# Patient Record
Sex: MALE | Race: WHITE
[De-identification: names, ages, dates, MRNs, and addresses within clinical notes are randomized per-mention and may not be internally consistent; named-entity substitution may affect disease eponyms.]

---

## 2017-05-16 ENCOUNTER — HOSPITAL ENCOUNTER (OUTPATIENT)
Dept: HOSPITAL 22 - CARL-LAB | Age: 57
End: 2017-05-16
Attending: NURSE PRACTITIONER
Payer: MEDICAID

## 2017-05-16 DIAGNOSIS — M62.81: ICD-10-CM

## 2017-05-16 DIAGNOSIS — I80.292: ICD-10-CM

## 2017-05-16 DIAGNOSIS — I63.9: Primary | ICD-10-CM

## 2021-01-01 ENCOUNTER — APPOINTMENT (OUTPATIENT)
Dept: PREADMISSION TESTING | Facility: HOSPITAL | Age: 61
End: 2021-01-01

## 2021-01-01 ENCOUNTER — OFFICE VISIT (OUTPATIENT)
Dept: CARDIAC SURGERY | Facility: CLINIC | Age: 61
End: 2021-01-01

## 2021-01-01 ENCOUNTER — HOSPITAL ENCOUNTER (OUTPATIENT)
Facility: HOSPITAL | Age: 61
Setting detail: SURGERY ADMIT
End: 2021-01-01
Attending: STUDENT IN AN ORGANIZED HEALTH CARE EDUCATION/TRAINING PROGRAM | Admitting: STUDENT IN AN ORGANIZED HEALTH CARE EDUCATION/TRAINING PROGRAM

## 2021-01-01 ENCOUNTER — HOSPITAL ENCOUNTER (OUTPATIENT)
Dept: CARDIOLOGY | Facility: HOSPITAL | Age: 61
Discharge: HOME | End: 2021-02-19

## 2021-01-01 VITALS
SYSTOLIC BLOOD PRESSURE: 150 MMHG | HEIGHT: 66 IN | TEMPERATURE: 97.8 F | DIASTOLIC BLOOD PRESSURE: 88 MMHG | WEIGHT: 241 LBS | BODY MASS INDEX: 38.73 KG/M2 | OXYGEN SATURATION: 97 % | HEART RATE: 97 BPM

## 2021-01-01 DIAGNOSIS — Z00.6 ENCOUNTER FOR EXAMINATION FOR NORMAL COMPARISON OR CONTROL IN CLINICAL RESEARCH PROGRAM: ICD-10-CM

## 2021-01-01 DIAGNOSIS — Z00.6 EXAMINATION FOR NORMAL COMPARISON FOR CLINICAL RESEARCH: Primary | ICD-10-CM

## 2021-01-01 DIAGNOSIS — I74.09 CHRONIC DISTAL AORTIC OCCLUSION (HCC): Primary | ICD-10-CM

## 2021-01-01 DIAGNOSIS — E66.01 SEVERE OBESITY WITH BODY MASS INDEX (BMI) OF 35.0 TO 39.9 WITH COMORBIDITY (HCC): ICD-10-CM

## 2021-01-01 DIAGNOSIS — J43.9 PULMONARY EMPHYSEMA, UNSPECIFIED EMPHYSEMA TYPE (HCC): Primary | ICD-10-CM

## 2021-01-01 DIAGNOSIS — I63.10 CEREBROVASCULAR ACCIDENT (CVA) DUE TO EMBOLISM OF PRECEREBRAL ARTERY (HCC): ICD-10-CM

## 2021-01-01 DIAGNOSIS — I73.9 PVD (PERIPHERAL VASCULAR DISEASE) (HCC): Primary | ICD-10-CM

## 2021-01-01 DIAGNOSIS — E66.01 MORBIDLY OBESE (HCC): ICD-10-CM

## 2021-01-01 DIAGNOSIS — J43.9 PULMONARY EMPHYSEMA, UNSPECIFIED EMPHYSEMA TYPE (HCC): ICD-10-CM

## 2021-01-01 PROCEDURE — 99205 OFFICE O/P NEW HI 60 MIN: CPT | Performed by: STUDENT IN AN ORGANIZED HEALTH CARE EDUCATION/TRAINING PROGRAM

## 2021-01-01 RX ORDER — ALBUTEROL SULFATE 0.63 MG/3ML
1 SOLUTION RESPIRATORY (INHALATION) EVERY 6 HOURS PRN
COMMUNITY

## 2021-01-01 RX ORDER — OMEPRAZOLE 20 MG/1
20 CAPSULE, DELAYED RELEASE ORAL DAILY
COMMUNITY

## 2021-01-01 RX ORDER — INSULIN GLARGINE 100 [IU]/ML
INJECTION, SOLUTION SUBCUTANEOUS DAILY
COMMUNITY

## 2021-01-01 RX ORDER — FLUTICASONE PROPIONATE 110 UG/1
1 AEROSOL, METERED RESPIRATORY (INHALATION)
COMMUNITY

## 2021-01-01 RX ORDER — GABAPENTIN 800 MG/1
800 TABLET ORAL 3 TIMES DAILY
COMMUNITY

## 2021-01-01 RX ORDER — LISINOPRIL 20 MG/1
20 TABLET ORAL DAILY
COMMUNITY

## 2021-01-01 RX ORDER — BUPROPION HYDROCHLORIDE 150 MG/1
150 TABLET, EXTENDED RELEASE ORAL 2 TIMES DAILY
COMMUNITY

## 2021-01-01 RX ORDER — WARFARIN SODIUM 2.5 MG/1
2.5 TABLET ORAL
COMMUNITY

## 2021-01-01 RX ORDER — ALBUTEROL SULFATE 2.5 MG/.5ML
SOLUTION RESPIRATORY (INHALATION)
COMMUNITY

## 2021-01-01 RX ORDER — WARFARIN SODIUM 1 MG/1
1 TABLET ORAL
COMMUNITY

## 2021-01-22 ENCOUNTER — HOSPITAL ENCOUNTER (OUTPATIENT)
Age: 61
End: 2021-01-22
Payer: MEDICAID

## 2021-01-22 DIAGNOSIS — I70.213: Primary | ICD-10-CM

## 2021-01-22 PROCEDURE — 93923 UPR/LXTR ART STDY 3+ LVLS: CPT

## 2021-02-03 ENCOUNTER — HOSPITAL ENCOUNTER (OUTPATIENT)
Age: 61
End: 2021-02-03
Payer: MEDICAID

## 2021-02-03 DIAGNOSIS — E78.5: ICD-10-CM

## 2021-02-03 DIAGNOSIS — Z86.718: ICD-10-CM

## 2021-02-03 DIAGNOSIS — I73.9: ICD-10-CM

## 2021-02-03 DIAGNOSIS — I20.9: ICD-10-CM

## 2021-02-03 DIAGNOSIS — I10: ICD-10-CM

## 2021-02-03 DIAGNOSIS — R06.00: Primary | ICD-10-CM

## 2021-02-03 PROCEDURE — 93306 TTE W/DOPPLER COMPLETE: CPT

## 2021-02-03 PROCEDURE — 93017 CV STRESS TEST TRACING ONLY: CPT

## 2021-02-03 PROCEDURE — A9502 TC99M TETROFOSMIN: HCPCS

## 2021-02-03 PROCEDURE — 78452 HT MUSCLE IMAGE SPECT MULT: CPT

## 2021-02-13 ENCOUNTER — HOSPITAL ENCOUNTER (OUTPATIENT)
Age: 61
End: 2021-02-13
Payer: MEDICAID

## 2021-02-13 DIAGNOSIS — I20.9: ICD-10-CM

## 2021-02-13 DIAGNOSIS — Z01.812: Primary | ICD-10-CM

## 2021-02-13 DIAGNOSIS — Z20.822: ICD-10-CM

## 2021-02-13 DIAGNOSIS — R06.00: ICD-10-CM

## 2021-02-13 LAB
ANION GAP SERPL CALC-SCNC: 14.5 MEQ/L (ref 5–15)
BUN SERPL-MCNC: 16 MG/DL (ref 9–20)
CALCIUM SPEC-MCNC: 10 MG/DL (ref 8.4–10.2)
CHLORIDE SPEC-SCNC: 99 MMOL/L (ref 98–107)
CO2 SERPL-SCNC: 23 MMOL/L (ref 22–30)
CREAT BLD-SCNC: 0.8 MG/DL (ref 0.66–1.25)
ESTIMATED GLOMERULAR FILT RATE: 98 ML/MIN (ref 60–?)
GFR (AFRICAN AMERICAN): 119 ML/MIN (ref 60–?)
GLUCOSE: 517 MG/DL (ref 74–100)
HCT VFR BLD CALC: 50.6 % (ref 42–52)
HGB BLD-MCNC: 16.9 G/DL (ref 14.1–18)
MCHC RBC-ENTMCNC: 33.3 G/DL (ref 31.8–35.4)
MCV RBC: 93.5 FL (ref 80–94)
MEAN CORPUSCULAR HEMOGLOBIN: 31.1 PG (ref 27–31.2)
PLATELET # BLD: 193 K/MM3 (ref 142–424)
POTASSIUM: 4.5 MMOL/L (ref 3.5–5.1)
RBC # BLD AUTO: 5.41 M/MM3 (ref 4.6–6.2)
SODIUM SPEC-SCNC: 132 MMOL/L (ref 136–145)
WBC # BLD AUTO: 9.8 K/MM3 (ref 4.8–10.8)

## 2021-02-13 PROCEDURE — 85025 COMPLETE CBC W/AUTO DIFF WBC: CPT

## 2021-02-13 PROCEDURE — 80048 BASIC METABOLIC PNL TOTAL CA: CPT

## 2021-02-13 PROCEDURE — 86328 IA NFCT AB SARSCOV2 COVID19: CPT

## 2021-02-13 PROCEDURE — 36415 COLL VENOUS BLD VENIPUNCTURE: CPT

## 2021-02-15 ENCOUNTER — HOSPITAL ENCOUNTER (OUTPATIENT)
Dept: HOSPITAL 22 - CATHLAB | Age: 61
Discharge: HOME | End: 2021-02-15
Payer: MEDICAID

## 2021-02-15 VITALS
RESPIRATION RATE: 18 BRPM | OXYGEN SATURATION: 94 % | DIASTOLIC BLOOD PRESSURE: 73 MMHG | SYSTOLIC BLOOD PRESSURE: 144 MMHG | HEART RATE: 90 BPM

## 2021-02-15 VITALS
SYSTOLIC BLOOD PRESSURE: 159 MMHG | DIASTOLIC BLOOD PRESSURE: 90 MMHG | OXYGEN SATURATION: 93 % | RESPIRATION RATE: 18 BRPM | HEART RATE: 91 BPM

## 2021-02-15 VITALS
OXYGEN SATURATION: 96 % | HEART RATE: 90 BPM | RESPIRATION RATE: 18 BRPM | SYSTOLIC BLOOD PRESSURE: 150 MMHG | DIASTOLIC BLOOD PRESSURE: 83 MMHG

## 2021-02-15 VITALS
RESPIRATION RATE: 18 BRPM | DIASTOLIC BLOOD PRESSURE: 83 MMHG | HEART RATE: 90 BPM | SYSTOLIC BLOOD PRESSURE: 163 MMHG | OXYGEN SATURATION: 96 %

## 2021-02-15 VITALS
HEART RATE: 89 BPM | RESPIRATION RATE: 16 BRPM | SYSTOLIC BLOOD PRESSURE: 168 MMHG | DIASTOLIC BLOOD PRESSURE: 78 MMHG | OXYGEN SATURATION: 95 %

## 2021-02-15 VITALS
OXYGEN SATURATION: 94 % | HEART RATE: 88 BPM | RESPIRATION RATE: 20 BRPM | DIASTOLIC BLOOD PRESSURE: 75 MMHG | SYSTOLIC BLOOD PRESSURE: 157 MMHG

## 2021-02-15 VITALS
DIASTOLIC BLOOD PRESSURE: 73 MMHG | SYSTOLIC BLOOD PRESSURE: 155 MMHG | HEART RATE: 94 BPM | RESPIRATION RATE: 18 BRPM | OXYGEN SATURATION: 92 %

## 2021-02-15 VITALS
DIASTOLIC BLOOD PRESSURE: 72 MMHG | RESPIRATION RATE: 18 BRPM | OXYGEN SATURATION: 94 % | HEART RATE: 88 BPM | SYSTOLIC BLOOD PRESSURE: 149 MMHG

## 2021-02-15 VITALS
HEART RATE: 88 BPM | SYSTOLIC BLOOD PRESSURE: 161 MMHG | OXYGEN SATURATION: 98 % | DIASTOLIC BLOOD PRESSURE: 84 MMHG | RESPIRATION RATE: 19 BRPM

## 2021-02-15 VITALS
HEART RATE: 89 BPM | DIASTOLIC BLOOD PRESSURE: 79 MMHG | RESPIRATION RATE: 18 BRPM | SYSTOLIC BLOOD PRESSURE: 128 MMHG | OXYGEN SATURATION: 97 %

## 2021-02-15 VITALS
HEART RATE: 94 BPM | SYSTOLIC BLOOD PRESSURE: 164 MMHG | RESPIRATION RATE: 18 BRPM | OXYGEN SATURATION: 95 % | DIASTOLIC BLOOD PRESSURE: 87 MMHG

## 2021-02-15 VITALS
SYSTOLIC BLOOD PRESSURE: 142 MMHG | DIASTOLIC BLOOD PRESSURE: 74 MMHG | HEART RATE: 94 BPM | RESPIRATION RATE: 19 BRPM | OXYGEN SATURATION: 95 %

## 2021-02-15 VITALS
SYSTOLIC BLOOD PRESSURE: 148 MMHG | HEART RATE: 90 BPM | RESPIRATION RATE: 18 BRPM | DIASTOLIC BLOOD PRESSURE: 75 MMHG | OXYGEN SATURATION: 90 %

## 2021-02-15 VITALS
SYSTOLIC BLOOD PRESSURE: 158 MMHG | OXYGEN SATURATION: 87 % | DIASTOLIC BLOOD PRESSURE: 75 MMHG | RESPIRATION RATE: 18 BRPM | HEART RATE: 96 BPM

## 2021-02-15 VITALS
HEART RATE: 92 BPM | RESPIRATION RATE: 19 BRPM | DIASTOLIC BLOOD PRESSURE: 81 MMHG | SYSTOLIC BLOOD PRESSURE: 154 MMHG | OXYGEN SATURATION: 93 %

## 2021-02-15 VITALS
DIASTOLIC BLOOD PRESSURE: 81 MMHG | RESPIRATION RATE: 18 BRPM | HEART RATE: 89 BPM | SYSTOLIC BLOOD PRESSURE: 149 MMHG | OXYGEN SATURATION: 95 %

## 2021-02-15 VITALS
HEART RATE: 96 BPM | SYSTOLIC BLOOD PRESSURE: 141 MMHG | OXYGEN SATURATION: 92 % | DIASTOLIC BLOOD PRESSURE: 66 MMHG | RESPIRATION RATE: 18 BRPM

## 2021-02-15 VITALS
RESPIRATION RATE: 18 BRPM | DIASTOLIC BLOOD PRESSURE: 89 MMHG | OXYGEN SATURATION: 95 % | SYSTOLIC BLOOD PRESSURE: 170 MMHG | HEART RATE: 93 BPM

## 2021-02-15 VITALS
SYSTOLIC BLOOD PRESSURE: 165 MMHG | OXYGEN SATURATION: 94 % | DIASTOLIC BLOOD PRESSURE: 89 MMHG | HEART RATE: 93 BPM | RESPIRATION RATE: 18 BRPM

## 2021-02-15 VITALS
HEART RATE: 96 BPM | RESPIRATION RATE: 18 BRPM | DIASTOLIC BLOOD PRESSURE: 73 MMHG | SYSTOLIC BLOOD PRESSURE: 142 MMHG | OXYGEN SATURATION: 94 %

## 2021-02-15 VITALS
DIASTOLIC BLOOD PRESSURE: 71 MMHG | RESPIRATION RATE: 18 BRPM | SYSTOLIC BLOOD PRESSURE: 188 MMHG | OXYGEN SATURATION: 90 % | HEART RATE: 98 BPM

## 2021-02-15 VITALS
RESPIRATION RATE: 19 BRPM | DIASTOLIC BLOOD PRESSURE: 67 MMHG | OXYGEN SATURATION: 93 % | SYSTOLIC BLOOD PRESSURE: 148 MMHG | HEART RATE: 87 BPM

## 2021-02-15 VITALS
DIASTOLIC BLOOD PRESSURE: 80 MMHG | SYSTOLIC BLOOD PRESSURE: 143 MMHG | OXYGEN SATURATION: 94 % | HEART RATE: 90 BPM | RESPIRATION RATE: 18 BRPM

## 2021-02-15 VITALS
HEART RATE: 91 BPM | SYSTOLIC BLOOD PRESSURE: 148 MMHG | RESPIRATION RATE: 18 BRPM | OXYGEN SATURATION: 94 % | DIASTOLIC BLOOD PRESSURE: 76 MMHG

## 2021-02-15 VITALS
HEART RATE: 95 BPM | OXYGEN SATURATION: 86 % | DIASTOLIC BLOOD PRESSURE: 72 MMHG | SYSTOLIC BLOOD PRESSURE: 144 MMHG | RESPIRATION RATE: 20 BRPM

## 2021-02-15 VITALS
DIASTOLIC BLOOD PRESSURE: 84 MMHG | RESPIRATION RATE: 18 BRPM | SYSTOLIC BLOOD PRESSURE: 157 MMHG | OXYGEN SATURATION: 97 % | HEART RATE: 89 BPM

## 2021-02-15 VITALS
SYSTOLIC BLOOD PRESSURE: 154 MMHG | OXYGEN SATURATION: 98 % | HEART RATE: 89 BPM | DIASTOLIC BLOOD PRESSURE: 80 MMHG | RESPIRATION RATE: 18 BRPM

## 2021-02-15 VITALS
SYSTOLIC BLOOD PRESSURE: 148 MMHG | DIASTOLIC BLOOD PRESSURE: 73 MMHG | RESPIRATION RATE: 19 BRPM | HEART RATE: 90 BPM | OXYGEN SATURATION: 95 %

## 2021-02-15 VITALS
DIASTOLIC BLOOD PRESSURE: 95 MMHG | HEART RATE: 92 BPM | TEMPERATURE: 97.88 F | RESPIRATION RATE: 18 BRPM | SYSTOLIC BLOOD PRESSURE: 157 MMHG | OXYGEN SATURATION: 96 %

## 2021-02-15 VITALS
DIASTOLIC BLOOD PRESSURE: 81 MMHG | OXYGEN SATURATION: 98 % | HEART RATE: 88 BPM | RESPIRATION RATE: 19 BRPM | SYSTOLIC BLOOD PRESSURE: 149 MMHG

## 2021-02-15 VITALS
RESPIRATION RATE: 17 BRPM | SYSTOLIC BLOOD PRESSURE: 136 MMHG | HEART RATE: 91 BPM | OXYGEN SATURATION: 92 % | DIASTOLIC BLOOD PRESSURE: 73 MMHG

## 2021-02-15 VITALS — BODY MASS INDEX: 38.9 KG/M2

## 2021-02-15 VITALS
RESPIRATION RATE: 20 BRPM | DIASTOLIC BLOOD PRESSURE: 85 MMHG | HEART RATE: 97 BPM | OXYGEN SATURATION: 91 % | SYSTOLIC BLOOD PRESSURE: 164 MMHG

## 2021-02-15 VITALS
RESPIRATION RATE: 22 BRPM | HEART RATE: 100 BPM | SYSTOLIC BLOOD PRESSURE: 189 MMHG | OXYGEN SATURATION: 93 % | DIASTOLIC BLOOD PRESSURE: 74 MMHG

## 2021-02-15 VITALS
OXYGEN SATURATION: 95 % | HEART RATE: 93 BPM | RESPIRATION RATE: 18 BRPM | DIASTOLIC BLOOD PRESSURE: 72 MMHG | SYSTOLIC BLOOD PRESSURE: 132 MMHG

## 2021-02-15 VITALS
HEART RATE: 91 BPM | RESPIRATION RATE: 16 BRPM | DIASTOLIC BLOOD PRESSURE: 83 MMHG | OXYGEN SATURATION: 95 % | SYSTOLIC BLOOD PRESSURE: 157 MMHG

## 2021-02-15 VITALS
HEART RATE: 89 BPM | DIASTOLIC BLOOD PRESSURE: 74 MMHG | OXYGEN SATURATION: 95 % | SYSTOLIC BLOOD PRESSURE: 141 MMHG | RESPIRATION RATE: 18 BRPM

## 2021-02-15 VITALS
RESPIRATION RATE: 22 BRPM | DIASTOLIC BLOOD PRESSURE: 83 MMHG | OXYGEN SATURATION: 91 % | HEART RATE: 91 BPM | SYSTOLIC BLOOD PRESSURE: 157 MMHG

## 2021-02-15 VITALS
SYSTOLIC BLOOD PRESSURE: 155 MMHG | DIASTOLIC BLOOD PRESSURE: 72 MMHG | OXYGEN SATURATION: 86 % | HEART RATE: 91 BPM | RESPIRATION RATE: 18 BRPM

## 2021-02-15 VITALS
HEART RATE: 90 BPM | SYSTOLIC BLOOD PRESSURE: 173 MMHG | DIASTOLIC BLOOD PRESSURE: 90 MMHG | RESPIRATION RATE: 19 BRPM | OXYGEN SATURATION: 99 %

## 2021-02-15 VITALS
SYSTOLIC BLOOD PRESSURE: 136 MMHG | OXYGEN SATURATION: 91 % | DIASTOLIC BLOOD PRESSURE: 75 MMHG | HEART RATE: 88 BPM | RESPIRATION RATE: 18 BRPM

## 2021-02-15 VITALS
DIASTOLIC BLOOD PRESSURE: 84 MMHG | RESPIRATION RATE: 18 BRPM | HEART RATE: 90 BPM | OXYGEN SATURATION: 96 % | SYSTOLIC BLOOD PRESSURE: 151 MMHG

## 2021-02-15 VITALS
HEART RATE: 95 BPM | DIASTOLIC BLOOD PRESSURE: 75 MMHG | OXYGEN SATURATION: 89 % | RESPIRATION RATE: 18 BRPM | SYSTOLIC BLOOD PRESSURE: 144 MMHG

## 2021-02-15 VITALS
RESPIRATION RATE: 18 BRPM | DIASTOLIC BLOOD PRESSURE: 71 MMHG | SYSTOLIC BLOOD PRESSURE: 148 MMHG | OXYGEN SATURATION: 92 % | HEART RATE: 88 BPM

## 2021-02-15 VITALS
OXYGEN SATURATION: 98 % | HEART RATE: 89 BPM | RESPIRATION RATE: 20 BRPM | DIASTOLIC BLOOD PRESSURE: 75 MMHG | SYSTOLIC BLOOD PRESSURE: 157 MMHG

## 2021-02-15 VITALS
HEART RATE: 87 BPM | OXYGEN SATURATION: 95 % | RESPIRATION RATE: 22 BRPM | SYSTOLIC BLOOD PRESSURE: 162 MMHG | DIASTOLIC BLOOD PRESSURE: 94 MMHG

## 2021-02-15 DIAGNOSIS — I70.223: Primary | ICD-10-CM

## 2021-02-15 DIAGNOSIS — Z79.899: ICD-10-CM

## 2021-02-15 DIAGNOSIS — E11.51: ICD-10-CM

## 2021-02-15 DIAGNOSIS — R06.00: ICD-10-CM

## 2021-02-15 DIAGNOSIS — I70.0: ICD-10-CM

## 2021-02-15 DIAGNOSIS — I20.9: ICD-10-CM

## 2021-02-15 DIAGNOSIS — Z86.718: ICD-10-CM

## 2021-02-15 DIAGNOSIS — R94.30: ICD-10-CM

## 2021-02-15 DIAGNOSIS — I77.1: ICD-10-CM

## 2021-02-15 DIAGNOSIS — Z79.01: ICD-10-CM

## 2021-02-15 DIAGNOSIS — I10: ICD-10-CM

## 2021-02-15 DIAGNOSIS — E78.5: ICD-10-CM

## 2021-02-15 DIAGNOSIS — Z79.4: ICD-10-CM

## 2021-02-15 LAB
CATHL ACTIVATED CLOTTING TIME: 254 SEC (ref 74–125)
INR PPP: 1.04 (ref 0.9–1.1)
PT BLD: 10.7 SECONDS (ref 9.4–11.8)

## 2021-02-15 PROCEDURE — 85347 COAGULATION TIME ACTIVATED: CPT

## 2021-02-15 PROCEDURE — 75716 ARTERY X-RAYS ARMS/LEGS: CPT

## 2021-02-15 PROCEDURE — 99153 MOD SED SAME PHYS/QHP EA: CPT

## 2021-02-15 PROCEDURE — 99152 MOD SED SAME PHYS/QHP 5/>YRS: CPT

## 2021-02-15 PROCEDURE — C1894 INTRO/SHEATH, NON-LASER: HCPCS

## 2021-02-15 PROCEDURE — 85610 PROTHROMBIN TIME: CPT

## 2021-02-15 PROCEDURE — C1725 CATH, TRANSLUMIN NON-LASER: HCPCS

## 2021-02-15 PROCEDURE — 36247 INS CATH ABD/L-EXT ART 3RD: CPT

## 2021-02-15 PROCEDURE — 93458 L HRT ARTERY/VENTRICLE ANGIO: CPT

## 2021-02-15 PROCEDURE — 36248 INS CATH ABD/L-EXT ART ADDL: CPT

## 2021-02-15 PROCEDURE — C1769 GUIDE WIRE: HCPCS

## 2021-02-17 ENCOUNTER — HOSPITAL ENCOUNTER (OUTPATIENT)
Age: 61
End: 2021-02-17
Payer: MEDICAID

## 2021-02-17 DIAGNOSIS — I71.3: Primary | ICD-10-CM

## 2021-02-17 LAB
BUN SERPL-MCNC: 11 MG/DL (ref 9–20)
CREAT BLD-SCNC: 0.8 MG/DL (ref 0.66–1.25)
ESTIMATED GLOMERULAR FILT RATE: 98 ML/MIN (ref 60–?)
GFR (AFRICAN AMERICAN): 119 ML/MIN (ref 60–?)

## 2021-02-17 PROCEDURE — 36415 COLL VENOUS BLD VENIPUNCTURE: CPT

## 2021-02-17 PROCEDURE — 75635 CT ANGIO ABDOMINAL ARTERIES: CPT

## 2021-02-17 PROCEDURE — 84520 ASSAY OF UREA NITROGEN: CPT

## 2021-02-17 PROCEDURE — 82565 ASSAY OF CREATININE: CPT

## 2021-02-18 PROBLEM — E11.59 TYPE 2 DIABETES MELLITUS WITH VASCULAR DISEASE (HCC): Status: ACTIVE | Noted: 2021-01-01

## 2021-02-18 PROBLEM — J43.9 COPD (CHRONIC OBSTRUCTIVE PULMONARY DISEASE) WITH EMPHYSEMA (HCC): Status: ACTIVE | Noted: 2021-01-01

## 2021-02-18 PROBLEM — I74.09 CHRONIC DISTAL AORTIC OCCLUSION (HCC): Status: ACTIVE | Noted: 2021-01-01

## 2021-02-18 PROBLEM — E66.01 SEVERE OBESITY WITH BODY MASS INDEX (BMI) OF 35.0 TO 39.9 WITH COMORBIDITY (HCC): Status: ACTIVE | Noted: 2021-01-01

## 2021-02-18 PROBLEM — I63.10 CEREBROVASCULAR ACCIDENT (CVA) DUE TO EMBOLISM OF PRECEREBRAL ARTERY (HCC): Status: ACTIVE | Noted: 2021-01-01

## 2021-02-18 NOTE — PROGRESS NOTES
Date: 2021   Patient Name: Evert Villatoro  Address: [address] [place] [id number]  : 1960   Phone #: [unfilled]   MRN: [id number]     Referring Physician: Ayan Hernandez MD    Chief Complaint:      Chief Complaint   Patient presents with   • Leg Pain     Referred by Dr. Hernandez for peripheral vascular disease. Patient has right leg claudication        History of Present Illness: Evert Villatoro is a 60 y.o. male who is here today to establish care with Cardiothoracic Surgery.  This is a 60-year-old male who is been in a wheelchair for 5 years following a stroke.  He is limited in his daily activities by symptoms of claudication.  He also has poor balance but mostly is limited in his walking by claudication which has been present for 5 years.  He was evaluated by a primary care physician and referred to Dr. Hernandez with concern for coronary artery disease.  He underwent a left heart cath which was attempted from femoral access however the wire was unable to pass.  Upon gaining radial access, patient had nonobstructive coronary disease which will be managed medically, but he was found to have a distal aortic occlusion.  Dr. Hernandez gave me a call and explained these findings and the next recommendation is for CT angiogram with runoff.  He was found to have a distal aortic occlusion with a short segment right iliac occlusion.  His left iliac is occluded for much longer length.  He has massive collaterals via his epigastric, lumbar and inferior mesenteric arteries.  The CT angiogram with runoff demonstrated good flow down the femoral vessels with trifurcated runoff.  Upon my exam he is short of breath and has difficulty forming more than a sentence.    Review of Systems:   Review of Systems   Constitution: Negative for chills, fever, malaise/fatigue, night sweats and weight loss.   HENT: Negative for hearing loss, odynophagia and sore throat.    Cardiovascular: Positive for  claudication, dyspnea on exertion, irregular heartbeat and leg swelling. Negative for chest pain, orthopnea and palpitations.   Respiratory: Positive for cough, shortness of breath, sputum production and wheezing. Negative for hemoptysis.    Endocrine: Negative for cold intolerance, heat intolerance, polydipsia, polyphagia and polyuria.   Hematologic/Lymphatic: Bruises/bleeds easily.   Skin: Negative for itching and rash.        Burn on right arm    Musculoskeletal: Positive for back pain and joint pain. Negative for joint swelling and myalgias.   Gastrointestinal: Negative for abdominal pain, constipation, diarrhea, hematemesis, hematochezia, melena, nausea and vomiting.   Genitourinary: Positive for frequency. Negative for dysuria and hematuria.   Neurological: Positive for dizziness, headaches, light-headedness, loss of balance and numbness. Negative for focal weakness and seizures.   Psychiatric/Behavioral: Positive for depression. Negative for suicidal ideas. The patient is nervous/anxious.    All other systems reviewed and are negative.       Past Medical History:   Past Medical History:   Diagnosis Date   • Anxiety    • CHF (congestive heart failure) (CMS/HCC)    • COPD (chronic obstructive pulmonary disease) (CMS/HCC)    • Deep vein thrombosis (CMS/HCC)    • Depression    • Diabetes mellitus (CMS/HCC)    • GERD (gastroesophageal reflux disease)    • Myocardial infarction (CMS/HCC)    • Peripheral vascular disease (CMS/HCC)        Past Surgical History:   Past Surgical History:   Procedure Laterality Date   • ARM WOUND REPAIR / CLOSURE Left     from cut       Family History:   Family History   Problem Relation Age of Onset   • Diabetes Mother    • Cancer Father        Social History:   Social History     Socioeconomic History   • Marital status: Single     Spouse name: Not on file   • Number of children: 5   • Years of education: Not on file   • Highest education level: Not on file   Occupational History      "Employer: DISABLED   Tobacco Use   • Smoking status: Current Every Day Smoker     Packs/day: 1.00     Years: 40.00     Pack years: 40.00     Types: Cigarettes   • Smokeless tobacco: Never Used   Substance and Sexual Activity   • Alcohol use: Not Currently   • Drug use: Never   Social History Narrative    Lives in [place], [place] with daughter        Medications:     Current Outpatient Medications:   •  albuterol (ACCUNEB) 0.63 MG/3ML nebulizer solution, Take 1 ampule by nebulization Every 6 (Six) Hours As Needed for Wheezing., Disp: , Rfl:   •  Albuterol Sulfate 2.5 MG/0.5ML nebulizer solution, Inhale., Disp: , Rfl:   •  buPROPion SR (WELLBUTRIN SR) 150 MG 12 hr tablet, Take 150 mg by mouth 2 (Two) Times a Day., Disp: , Rfl:   •  fluticasone (FLOVENT HFA) 110 MCG/ACT inhaler, Inhale 1 puff 2 (Two) Times a Day., Disp: , Rfl:   •  gabapentin (NEURONTIN) 800 MG tablet, Take 800 mg by mouth 3 (Three) Times a Day., Disp: , Rfl:   •  insulin glargine (LANTUS, SEMGLEE) 100 UNIT/ML injection, Inject  under the skin into the appropriate area as directed Daily., Disp: , Rfl:   •  lisinopril (PRINIVIL,ZESTRIL) 20 MG tablet, Take 20 mg by mouth Daily., Disp: , Rfl:   •  metFORMIN (GLUCOPHAGE) 500 MG tablet, Take 500 mg by mouth 2 (Two) Times a Day With Meals., Disp: , Rfl:   •  omeprazole (priLOSEC) 20 MG capsule, Take 20 mg by mouth Daily., Disp: , Rfl:   •  warfarin (COUMADIN) 1 MG tablet, Take 1 mg by mouth Daily., Disp: , Rfl:   •  warfarin (COUMADIN) 2.5 MG tablet, Take 2.5 mg by mouth Daily., Disp: , Rfl:     Allergies:   No Known Allergies    Physical Exam:  Vital Signs:   Vitals:    02/18/21 1158   BP: 150/88   BP Location: Right arm   Patient Position: Sitting   Pulse: 97   Temp: 97.8 °F (36.6 °C)   SpO2: 97%   Weight: 109 kg (241 lb)   Height: 167.6 cm (66\")     Body mass index is 38.9 kg/m².     Physical Exam  Vitals signs and nursing note reviewed.   Constitutional:       Appearance: He is well-developed. He is obese. " He is not toxic-appearing.   HENT:      Head: Normocephalic.   Eyes:      Conjunctiva/sclera: Conjunctivae normal.      Pupils: Pupils are equal, round, and reactive to light.   Neck:      Musculoskeletal: Normal range of motion.      Vascular: No carotid bruit or JVD.   Cardiovascular:      Rate and Rhythm: Normal rate and regular rhythm.      Pulses:           Carotid pulses are 2+ on the right side and 2+ on the left side.       Radial pulses are 2+ on the right side and 2+ on the left side.        Femoral pulses are 1+ on the right side and 1+ on the left side.       Popliteal pulses are 2+ on the right side and 2+ on the left side.        Dorsalis pedis pulses are 2+ on the right side and 2+ on the left side.        Posterior tibial pulses are 1+ on the right side and 1+ on the left side.      Heart sounds: Normal heart sounds. No murmur. No systolic murmur. No diastolic murmur. No friction rub. No gallop.    Pulmonary:      Effort: Pulmonary effort is normal. No respiratory distress.      Breath sounds: Wheezing present. No rhonchi or rales.   Chest:      Chest wall: No tenderness.   Abdominal:      General: Bowel sounds are normal. There is no distension.      Palpations: Abdomen is soft. There is no mass.      Tenderness: There is no abdominal tenderness. There is no guarding.   Musculoskeletal: Normal range of motion.   Lymphadenopathy:      Cervical: No cervical adenopathy.   Skin:     General: Skin is warm and dry.      Capillary Refill: Capillary refill takes less than 2 seconds.      Findings: No rash.   Neurological:      Mental Status: He is alert and oriented to person, place, and time.      Cranial Nerves: No cranial nerve deficit.   Psychiatric:         Speech: Speech normal.         Behavior: Behavior normal.         Thought Content: Thought content normal.         Judgment: Judgment normal.     Imaging: He has a CT angiogram from 2/17 which demonstrates distal aortic occlusion as well as  bilateral iliac occlusion with good reconstitution and three-vessel runoff.  Personally reviewed by me    Assessment/Plan:   A 60 y.o. male with the following:     Diagnoses and all orders for this visit:    1. Chronic distal aortic occlusion (CMS/HCC) (Primary)    2. Severe obesity with body mass index (BMI) of 35.0 to 39.9 with comorbidity (CMS/HCC)    3. Cerebrovascular accident (CVA) due to embolism of precerebral artery (CMS/HCC)    4. Pulmonary emphysema, unspecified emphysema type (CMS/HCC)        1.  Distal aortic occlusion-he certainly would require revascularization.  His COPD and body habitus likely preclude a transabdominal (aortobifemoral bypass) procedure.  I am worried with his pulmonary status that an aortobifemoral bypass could cause prolonged respiratory failure and possible tracheostomy.  We discussed a extra-anatomic bypass with an axillobifemoral bypass and have scheduled him for the beginning of March.  In the interim I spoke with Dr. Hernandez who will attempt angiographically to get one of the iliac vessels open and place a stent.  If this is the case then he can have a femoral-femoral bypass which would have a increased patency.  2.  BMI of 39-this will complicate things technically with regards to graft rounding.  He is 5 foot 6 and carries most of his weight in his abdomen.  I think with his limited functional capacity that significant weight loss prior to surgery will be impossible  3.  CVA-he is on Coumadin, continue  4.  Emphysema-he has severe pulmonary emphysema.  I have obtained a pulmonary consult to see if any optimization prior to surgery can be performed.  I have ordered PFTs and recommended that he stop smoking.  5.  Diabetes continue current meds.  His last blood sugar on 2/13 was 517.  Predisposes him towards wound infections as well as further small vessel disease    Evert Villatoro  reports that he has been smoking cigarettes. He has a 40.00 pack-year smoking history. He has  never used smokeless tobacco.. I have educated him on the risk of diseases from using tobacco products such as cancer, COPD and heart disease.     I advised him to quit and he is not willing to quit.    I spent 5.5 minutes counseling the patient.             Follow Up:   No follow-ups on file.    The ROS, past medical history, surgical history, family history, social history and vitals were reviewed by myself and corrected as needed.      Ac Adler MD  [facility] Cardiothoracic Surgery   Electronically signed by Ac Adler MD, 02/18/21, 12:08 PM EST.

## 2021-02-22 PROBLEM — I73.9 PVD (PERIPHERAL VASCULAR DISEASE) (HCC): Status: ACTIVE | Noted: 2021-01-01

## 2021-02-24 ENCOUNTER — HOSPITAL ENCOUNTER (OUTPATIENT)
Age: 61
End: 2021-02-24
Payer: MEDICAID

## 2021-02-24 DIAGNOSIS — R06.00: Primary | ICD-10-CM

## 2021-02-24 DIAGNOSIS — R68.89: ICD-10-CM

## 2021-02-24 DIAGNOSIS — I20.9: ICD-10-CM

## 2021-02-24 DIAGNOSIS — Z86.718: ICD-10-CM

## 2021-02-24 DIAGNOSIS — I10: ICD-10-CM

## 2021-02-24 DIAGNOSIS — I70.213: ICD-10-CM

## 2021-02-24 DIAGNOSIS — R94.30: ICD-10-CM

## 2021-02-24 DIAGNOSIS — E78.5: ICD-10-CM

## 2021-02-24 LAB
ANION GAP SERPL CALC-SCNC: 12.7 MEQ/L (ref 5–15)
BUN SERPL-MCNC: 12 MG/DL (ref 9–20)
CALCIUM SPEC-MCNC: 10.1 MG/DL (ref 8.4–10.2)
CHLORIDE SPEC-SCNC: 103 MMOL/L (ref 98–107)
CO2 SERPL-SCNC: 30 MMOL/L (ref 22–30)
CREAT BLD-SCNC: 0.8 MG/DL (ref 0.66–1.25)
ESTIMATED GLOMERULAR FILT RATE: 98 ML/MIN (ref 60–?)
GFR (AFRICAN AMERICAN): 119 ML/MIN (ref 60–?)
GLUCOSE: 297 MG/DL (ref 74–100)
HCT VFR BLD CALC: 51.4 % (ref 42–52)
HGB BLD-MCNC: 16 G/DL (ref 14.1–18)
MCHC RBC-ENTMCNC: 31.2 G/DL (ref 31.8–35.4)
MCV RBC: 97.4 FL (ref 80–94)
MEAN CORPUSCULAR HEMOGLOBIN: 30.4 PG (ref 27–31.2)
PLATELET # BLD: 263 K/MM3 (ref 142–424)
POTASSIUM: 4.7 MMOL/L (ref 3.5–5.1)
RBC # BLD AUTO: 5.28 M/MM3 (ref 4.6–6.2)
SODIUM SPEC-SCNC: 141 MMOL/L (ref 136–145)
WBC # BLD AUTO: 8.9 K/MM3 (ref 4.8–10.8)

## 2021-02-24 PROCEDURE — 36415 COLL VENOUS BLD VENIPUNCTURE: CPT

## 2021-02-24 PROCEDURE — 86328 IA NFCT AB SARSCOV2 COVID19: CPT

## 2021-02-24 PROCEDURE — 80048 BASIC METABOLIC PNL TOTAL CA: CPT

## 2021-02-24 PROCEDURE — 85025 COMPLETE CBC W/AUTO DIFF WBC: CPT

## 2021-02-25 ENCOUNTER — HOSPITAL ENCOUNTER (INPATIENT)
Dept: HOSPITAL 22 - 2ND | Age: 61
LOS: 6 days | DRG: 252 | End: 2021-03-03
Payer: MEDICAID

## 2021-02-25 VITALS
SYSTOLIC BLOOD PRESSURE: 122 MMHG | RESPIRATION RATE: 16 BRPM | DIASTOLIC BLOOD PRESSURE: 63 MMHG | HEART RATE: 97 BPM | OXYGEN SATURATION: 95 %

## 2021-02-25 VITALS
DIASTOLIC BLOOD PRESSURE: 84 MMHG | HEART RATE: 98 BPM | OXYGEN SATURATION: 94 % | SYSTOLIC BLOOD PRESSURE: 133 MMHG | RESPIRATION RATE: 16 BRPM

## 2021-02-25 VITALS
DIASTOLIC BLOOD PRESSURE: 71 MMHG | HEART RATE: 93 BPM | OXYGEN SATURATION: 91 % | SYSTOLIC BLOOD PRESSURE: 153 MMHG | RESPIRATION RATE: 17 BRPM

## 2021-02-25 VITALS
BODY MASS INDEX: 38.9 KG/M2 | BODY MASS INDEX: 40.8 KG/M2 | BODY MASS INDEX: 38.2 KG/M2 | BODY MASS INDEX: 38.4 KG/M2 | BODY MASS INDEX: 38.5 KG/M2 | BODY MASS INDEX: 41 KG/M2 | BODY MASS INDEX: 40.7 KG/M2

## 2021-02-25 VITALS
RESPIRATION RATE: 20 BRPM | DIASTOLIC BLOOD PRESSURE: 61 MMHG | OXYGEN SATURATION: 92 % | SYSTOLIC BLOOD PRESSURE: 130 MMHG | HEART RATE: 99 BPM

## 2021-02-25 VITALS
OXYGEN SATURATION: 92 % | DIASTOLIC BLOOD PRESSURE: 67 MMHG | SYSTOLIC BLOOD PRESSURE: 127 MMHG | RESPIRATION RATE: 20 BRPM | HEART RATE: 99 BPM

## 2021-02-25 VITALS
SYSTOLIC BLOOD PRESSURE: 122 MMHG | DIASTOLIC BLOOD PRESSURE: 58 MMHG | HEART RATE: 95 BPM | TEMPERATURE: 98.24 F | RESPIRATION RATE: 18 BRPM | OXYGEN SATURATION: 92 %

## 2021-02-25 VITALS
DIASTOLIC BLOOD PRESSURE: 77 MMHG | HEART RATE: 94 BPM | SYSTOLIC BLOOD PRESSURE: 126 MMHG | RESPIRATION RATE: 17 BRPM | OXYGEN SATURATION: 94 %

## 2021-02-25 VITALS
SYSTOLIC BLOOD PRESSURE: 117 MMHG | DIASTOLIC BLOOD PRESSURE: 80 MMHG | RESPIRATION RATE: 17 BRPM | OXYGEN SATURATION: 95 % | HEART RATE: 103 BPM

## 2021-02-25 VITALS
HEART RATE: 98 BPM | DIASTOLIC BLOOD PRESSURE: 61 MMHG | OXYGEN SATURATION: 91 % | RESPIRATION RATE: 20 BRPM | SYSTOLIC BLOOD PRESSURE: 150 MMHG

## 2021-02-25 VITALS
SYSTOLIC BLOOD PRESSURE: 131 MMHG | HEART RATE: 98 BPM | DIASTOLIC BLOOD PRESSURE: 81 MMHG | RESPIRATION RATE: 16 BRPM | OXYGEN SATURATION: 95 % | TEMPERATURE: 98.1 F

## 2021-02-25 VITALS
TEMPERATURE: 97.8 F | SYSTOLIC BLOOD PRESSURE: 157 MMHG | RESPIRATION RATE: 22 BRPM | HEART RATE: 86 BPM | DIASTOLIC BLOOD PRESSURE: 83 MMHG | OXYGEN SATURATION: 95 %

## 2021-02-25 VITALS
OXYGEN SATURATION: 91 % | HEART RATE: 92 BPM | SYSTOLIC BLOOD PRESSURE: 141 MMHG | DIASTOLIC BLOOD PRESSURE: 73 MMHG | RESPIRATION RATE: 16 BRPM

## 2021-02-25 VITALS — DIASTOLIC BLOOD PRESSURE: 67 MMHG | OXYGEN SATURATION: 90 % | SYSTOLIC BLOOD PRESSURE: 127 MMHG | HEART RATE: 97 BPM

## 2021-02-25 VITALS
SYSTOLIC BLOOD PRESSURE: 131 MMHG | RESPIRATION RATE: 16 BRPM | OXYGEN SATURATION: 94 % | HEART RATE: 99 BPM | DIASTOLIC BLOOD PRESSURE: 81 MMHG

## 2021-02-25 VITALS
HEART RATE: 98 BPM | RESPIRATION RATE: 16 BRPM | SYSTOLIC BLOOD PRESSURE: 114 MMHG | OXYGEN SATURATION: 99 % | DIASTOLIC BLOOD PRESSURE: 56 MMHG

## 2021-02-25 VITALS
DIASTOLIC BLOOD PRESSURE: 80 MMHG | TEMPERATURE: 98.1 F | OXYGEN SATURATION: 90 % | HEART RATE: 101 BPM | RESPIRATION RATE: 18 BRPM | SYSTOLIC BLOOD PRESSURE: 117 MMHG

## 2021-02-25 VITALS — HEART RATE: 88 BPM

## 2021-02-25 VITALS — OXYGEN SATURATION: 93 %

## 2021-02-25 DIAGNOSIS — I74.5: ICD-10-CM

## 2021-02-25 DIAGNOSIS — I70.228: ICD-10-CM

## 2021-02-25 DIAGNOSIS — I70.223: ICD-10-CM

## 2021-02-25 DIAGNOSIS — Z79.01: ICD-10-CM

## 2021-02-25 DIAGNOSIS — Z72.0: ICD-10-CM

## 2021-02-25 DIAGNOSIS — E11.51: Primary | ICD-10-CM

## 2021-02-25 DIAGNOSIS — I70.92: ICD-10-CM

## 2021-02-25 DIAGNOSIS — Z79.4: ICD-10-CM

## 2021-02-25 DIAGNOSIS — I46.9: ICD-10-CM

## 2021-02-25 DIAGNOSIS — J44.9: ICD-10-CM

## 2021-02-25 DIAGNOSIS — I70.0: ICD-10-CM

## 2021-02-25 DIAGNOSIS — J96.00: ICD-10-CM

## 2021-02-25 DIAGNOSIS — Z99.81: ICD-10-CM

## 2021-02-25 DIAGNOSIS — F10.29: ICD-10-CM

## 2021-02-25 DIAGNOSIS — E66.9: ICD-10-CM

## 2021-02-25 DIAGNOSIS — I10: ICD-10-CM

## 2021-02-25 DIAGNOSIS — I77.1: ICD-10-CM

## 2021-02-25 LAB
ANION GAP SERPL CALC-SCNC: 12.4 MEQ/L (ref 5–15)
APTT PPP: 115.1 SECONDS (ref 23.6–34)
APTT PPP: 32.6 SECONDS (ref 23.6–34)
APTT PPP: 59 SECONDS (ref 23.6–34)
APTT PPP: 71.2 SECONDS (ref 23.6–34)
BUN SERPL-MCNC: 10 MG/DL (ref 9–20)
CALCIUM SPEC-MCNC: 8.7 MG/DL (ref 8.4–10.2)
CHLORIDE SPEC-SCNC: 106 MMOL/L (ref 98–107)
CO2 SERPL-SCNC: 21 MMOL/L (ref 22–30)
COLOR UR: YELLOW
CREAT BLD-SCNC: 0.7 MG/DL (ref 0.66–1.25)
CREATININE CLEARANCE ESTIMATED: 120 ML/MIN (ref 50–200)
ESTIMATED GLOMERULAR FILT RATE: 115 ML/MIN (ref 60–?)
GFR (AFRICAN AMERICAN): 139 ML/MIN (ref 60–?)
GLUCOSE BLDC GLUCOMTR-MCNC: 228 MG/DL (ref 70–110)
GLUCOSE: 244 MG/DL (ref 74–100)
INR PPP: 1.74 (ref 0.9–1.1)
INR PPP: 1.91 (ref 0.9–1.1)
MAGNESIUM: 1.6 MG/DL (ref 1.6–2.3)
MICRO URNS: (no result)
PH UR: 5 [PH] (ref 5–8.5)
POTASSIUM: 4.4 MMOL/L (ref 3.5–5.1)
PT BLD: 18.4 SECONDS (ref 9.4–11.8)
PT BLD: 20 SECONDS (ref 9.4–11.8)
SODIUM SPEC-SCNC: 135 MMOL/L (ref 136–145)
SP GR UR: 1.01 (ref 1–1.03)
UROBILINOGEN UR QL: 0.2 EU/DL

## 2021-02-25 PROCEDURE — 80048 BASIC METABOLIC PNL TOTAL CA: CPT

## 2021-02-25 PROCEDURE — 81001 URINALYSIS AUTO W/SCOPE: CPT

## 2021-02-25 PROCEDURE — C1725 CATH, TRANSLUMIN NON-LASER: HCPCS

## 2021-02-25 PROCEDURE — 82962 GLUCOSE BLOOD TEST: CPT

## 2021-02-25 PROCEDURE — 047D34Z DILATION OF LEFT COMMON ILIAC ARTERY WITH DRUG-ELUTING INTRALUMINAL DEVICE, PERCUTANEOUS APPROACH: ICD-10-PCS | Performed by: INTERNAL MEDICINE

## 2021-02-25 PROCEDURE — 37221: CPT

## 2021-02-25 PROCEDURE — 94760 N-INVAS EAR/PLS OXIMETRY 1: CPT

## 2021-02-25 PROCEDURE — 82803 BLOOD GASES ANY COMBINATION: CPT

## 2021-02-25 PROCEDURE — C1766 INTRO/SHEATH,STRBLE,NON-PEEL: HCPCS

## 2021-02-25 PROCEDURE — 83735 ASSAY OF MAGNESIUM: CPT

## 2021-02-25 PROCEDURE — 74019 RADEX ABDOMEN 2 VIEWS: CPT

## 2021-02-25 PROCEDURE — 85610 PROTHROMBIN TIME: CPT

## 2021-02-25 PROCEDURE — 93005 ELECTROCARDIOGRAM TRACING: CPT

## 2021-02-25 PROCEDURE — 74176 CT ABD & PELVIS W/O CONTRAST: CPT

## 2021-02-25 PROCEDURE — 99152 MOD SED SAME PHYS/QHP 5/>YRS: CPT

## 2021-02-25 PROCEDURE — 80053 COMPREHEN METABOLIC PANEL: CPT

## 2021-02-25 PROCEDURE — 37236 OPEN/PERQ PLACE STENT 1ST: CPT

## 2021-02-25 PROCEDURE — C1894 INTRO/SHEATH, NON-LASER: HCPCS

## 2021-02-25 PROCEDURE — 85730 THROMBOPLASTIN TIME PARTIAL: CPT

## 2021-02-25 PROCEDURE — C1769 GUIDE WIRE: HCPCS

## 2021-02-25 PROCEDURE — 36415 COLL VENOUS BLD VENIPUNCTURE: CPT

## 2021-02-25 PROCEDURE — 85025 COMPLETE CBC W/AUTO DIFF WBC: CPT

## 2021-02-25 PROCEDURE — C1876 STENT, NON-COA/NON-COV W/DEL: HCPCS

## 2021-02-25 PROCEDURE — U0003 INFECTIOUS AGENT DETECTION BY NUCLEIC ACID (DNA OR RNA); SEVERE ACUTE RESPIRATORY SYNDROME CORONAVIRUS 2 (SARS-COV-2) (CORONAVIRUS DISEASE [COVID-19]), AMPLIFIED PROBE TECHNIQUE, MAKING USE OF HIGH THROUGHPUT TECHNOLOGIES AS DESCRIBED BY CMS-2020-01-R: HCPCS

## 2021-02-25 PROCEDURE — C1760 CLOSURE DEV, VASC: HCPCS

## 2021-02-25 PROCEDURE — 85007 BL SMEAR W/DIFF WBC COUNT: CPT

## 2021-02-25 PROCEDURE — 94002 VENT MGMT INPAT INIT DAY: CPT

## 2021-02-25 PROCEDURE — 86850 RBC ANTIBODY SCREEN: CPT

## 2021-02-25 PROCEDURE — 37223: CPT

## 2021-02-25 PROCEDURE — 37226: CPT

## 2021-02-25 PROCEDURE — 71045 X-RAY EXAM CHEST 1 VIEW: CPT

## 2021-02-25 PROCEDURE — 85347 COAGULATION TIME ACTIVATED: CPT

## 2021-02-25 PROCEDURE — 80076 HEPATIC FUNCTION PANEL: CPT

## 2021-02-25 PROCEDURE — 94640 AIRWAY INHALATION TREATMENT: CPT

## 2021-02-25 PROCEDURE — 99153 MOD SED SAME PHYS/QHP EA: CPT

## 2021-02-25 PROCEDURE — 86328 IA NFCT AB SARSCOV2 COVID19: CPT

## 2021-02-25 NOTE — PC.NURSE
Heparin gtt increased to 1700 units/hr per [facility] @ 2130. Heparin gtt increased to 1800 units/hr @ 2314 per [facility]. Draw PTT in an hour.

## 2021-02-26 VITALS
SYSTOLIC BLOOD PRESSURE: 107 MMHG | DIASTOLIC BLOOD PRESSURE: 43 MMHG | OXYGEN SATURATION: 91 % | RESPIRATION RATE: 26 BRPM | HEART RATE: 120 BPM

## 2021-02-26 VITALS
DIASTOLIC BLOOD PRESSURE: 50 MMHG | OXYGEN SATURATION: 98 % | HEART RATE: 111 BPM | RESPIRATION RATE: 24 BRPM | SYSTOLIC BLOOD PRESSURE: 80 MMHG

## 2021-02-26 VITALS
RESPIRATION RATE: 17 BRPM | HEART RATE: 108 BPM | SYSTOLIC BLOOD PRESSURE: 87 MMHG | DIASTOLIC BLOOD PRESSURE: 39 MMHG | OXYGEN SATURATION: 93 %

## 2021-02-26 VITALS — OXYGEN SATURATION: 100 % | HEART RATE: 106 BPM | SYSTOLIC BLOOD PRESSURE: 83 MMHG | DIASTOLIC BLOOD PRESSURE: 34 MMHG

## 2021-02-26 VITALS
DIASTOLIC BLOOD PRESSURE: 44 MMHG | OXYGEN SATURATION: 94 % | HEART RATE: 95 BPM | SYSTOLIC BLOOD PRESSURE: 88 MMHG | RESPIRATION RATE: 19 BRPM

## 2021-02-26 VITALS
RESPIRATION RATE: 20 BRPM | HEART RATE: 98 BPM | DIASTOLIC BLOOD PRESSURE: 59 MMHG | SYSTOLIC BLOOD PRESSURE: 144 MMHG | TEMPERATURE: 98.06 F | OXYGEN SATURATION: 92 %

## 2021-02-26 VITALS
DIASTOLIC BLOOD PRESSURE: 49 MMHG | RESPIRATION RATE: 20 BRPM | SYSTOLIC BLOOD PRESSURE: 102 MMHG | HEART RATE: 93 BPM | OXYGEN SATURATION: 95 %

## 2021-02-26 VITALS — OXYGEN SATURATION: 94 % | HEART RATE: 102 BPM | DIASTOLIC BLOOD PRESSURE: 46 MMHG | SYSTOLIC BLOOD PRESSURE: 78 MMHG

## 2021-02-26 VITALS
SYSTOLIC BLOOD PRESSURE: 165 MMHG | OXYGEN SATURATION: 96 % | HEART RATE: 113 BPM | DIASTOLIC BLOOD PRESSURE: 100 MMHG | RESPIRATION RATE: 20 BRPM

## 2021-02-26 VITALS
RESPIRATION RATE: 20 BRPM | SYSTOLIC BLOOD PRESSURE: 159 MMHG | DIASTOLIC BLOOD PRESSURE: 84 MMHG | HEART RATE: 117 BPM | OXYGEN SATURATION: 95 %

## 2021-02-26 VITALS — OXYGEN SATURATION: 93 % | HEART RATE: 107 BPM | SYSTOLIC BLOOD PRESSURE: 86 MMHG | DIASTOLIC BLOOD PRESSURE: 42 MMHG

## 2021-02-26 VITALS
DIASTOLIC BLOOD PRESSURE: 45 MMHG | HEART RATE: 94 BPM | SYSTOLIC BLOOD PRESSURE: 101 MMHG | OXYGEN SATURATION: 96 % | RESPIRATION RATE: 18 BRPM

## 2021-02-26 VITALS
OXYGEN SATURATION: 97 % | SYSTOLIC BLOOD PRESSURE: 164 MMHG | RESPIRATION RATE: 20 BRPM | DIASTOLIC BLOOD PRESSURE: 85 MMHG | HEART RATE: 115 BPM

## 2021-02-26 VITALS
HEART RATE: 96 BPM | RESPIRATION RATE: 17 BRPM | SYSTOLIC BLOOD PRESSURE: 105 MMHG | OXYGEN SATURATION: 93 % | DIASTOLIC BLOOD PRESSURE: 49 MMHG

## 2021-02-26 VITALS
OXYGEN SATURATION: 98 % | SYSTOLIC BLOOD PRESSURE: 199 MMHG | RESPIRATION RATE: 21 BRPM | HEART RATE: 113 BPM | DIASTOLIC BLOOD PRESSURE: 93 MMHG

## 2021-02-26 VITALS
SYSTOLIC BLOOD PRESSURE: 104 MMHG | RESPIRATION RATE: 17 BRPM | OXYGEN SATURATION: 90 % | DIASTOLIC BLOOD PRESSURE: 48 MMHG | HEART RATE: 99 BPM

## 2021-02-26 VITALS
OXYGEN SATURATION: 94 % | RESPIRATION RATE: 16 BRPM | SYSTOLIC BLOOD PRESSURE: 98 MMHG | HEART RATE: 93 BPM | DIASTOLIC BLOOD PRESSURE: 48 MMHG

## 2021-02-26 VITALS
OXYGEN SATURATION: 91 % | HEART RATE: 96 BPM | RESPIRATION RATE: 17 BRPM | SYSTOLIC BLOOD PRESSURE: 140 MMHG | TEMPERATURE: 98.06 F | DIASTOLIC BLOOD PRESSURE: 71 MMHG

## 2021-02-26 VITALS
SYSTOLIC BLOOD PRESSURE: 171 MMHG | HEART RATE: 117 BPM | RESPIRATION RATE: 22 BRPM | DIASTOLIC BLOOD PRESSURE: 89 MMHG | OXYGEN SATURATION: 97 %

## 2021-02-26 VITALS
RESPIRATION RATE: 22 BRPM | HEART RATE: 119 BPM | TEMPERATURE: 100.4 F | OXYGEN SATURATION: 96 % | DIASTOLIC BLOOD PRESSURE: 88 MMHG | SYSTOLIC BLOOD PRESSURE: 169 MMHG

## 2021-02-26 VITALS — HEART RATE: 93 BPM | DIASTOLIC BLOOD PRESSURE: 44 MMHG | SYSTOLIC BLOOD PRESSURE: 108 MMHG | OXYGEN SATURATION: 92 %

## 2021-02-26 VITALS
RESPIRATION RATE: 22 BRPM | OXYGEN SATURATION: 91 % | SYSTOLIC BLOOD PRESSURE: 130 MMHG | HEART RATE: 121 BPM | DIASTOLIC BLOOD PRESSURE: 66 MMHG

## 2021-02-26 VITALS
HEART RATE: 111 BPM | DIASTOLIC BLOOD PRESSURE: 46 MMHG | OXYGEN SATURATION: 97 % | SYSTOLIC BLOOD PRESSURE: 68 MMHG | RESPIRATION RATE: 24 BRPM

## 2021-02-26 VITALS
RESPIRATION RATE: 20 BRPM | DIASTOLIC BLOOD PRESSURE: 87 MMHG | OXYGEN SATURATION: 98 % | SYSTOLIC BLOOD PRESSURE: 177 MMHG | HEART RATE: 109 BPM

## 2021-02-26 VITALS
OXYGEN SATURATION: 96 % | SYSTOLIC BLOOD PRESSURE: 101 MMHG | RESPIRATION RATE: 20 BRPM | HEART RATE: 99 BPM | DIASTOLIC BLOOD PRESSURE: 46 MMHG

## 2021-02-26 VITALS
OXYGEN SATURATION: 94 % | RESPIRATION RATE: 18 BRPM | SYSTOLIC BLOOD PRESSURE: 169 MMHG | DIASTOLIC BLOOD PRESSURE: 83 MMHG | HEART RATE: 99 BPM | TEMPERATURE: 98.8 F

## 2021-02-26 VITALS
RESPIRATION RATE: 20 BRPM | HEART RATE: 117 BPM | SYSTOLIC BLOOD PRESSURE: 164 MMHG | OXYGEN SATURATION: 96 % | DIASTOLIC BLOOD PRESSURE: 78 MMHG

## 2021-02-26 VITALS
HEART RATE: 121 BPM | OXYGEN SATURATION: 97 % | RESPIRATION RATE: 20 BRPM | DIASTOLIC BLOOD PRESSURE: 71 MMHG | SYSTOLIC BLOOD PRESSURE: 143 MMHG

## 2021-02-26 VITALS — HEART RATE: 106 BPM | OXYGEN SATURATION: 94 % | SYSTOLIC BLOOD PRESSURE: 82 MMHG | DIASTOLIC BLOOD PRESSURE: 44 MMHG

## 2021-02-26 VITALS
DIASTOLIC BLOOD PRESSURE: 51 MMHG | SYSTOLIC BLOOD PRESSURE: 88 MMHG | RESPIRATION RATE: 20 BRPM | OXYGEN SATURATION: 93 % | HEART RATE: 99 BPM

## 2021-02-26 VITALS
DIASTOLIC BLOOD PRESSURE: 33 MMHG | OXYGEN SATURATION: 93 % | HEART RATE: 107 BPM | SYSTOLIC BLOOD PRESSURE: 106 MMHG | RESPIRATION RATE: 20 BRPM

## 2021-02-26 VITALS
DIASTOLIC BLOOD PRESSURE: 85 MMHG | OXYGEN SATURATION: 98 % | SYSTOLIC BLOOD PRESSURE: 175 MMHG | RESPIRATION RATE: 19 BRPM | HEART RATE: 108 BPM

## 2021-02-26 VITALS
SYSTOLIC BLOOD PRESSURE: 177 MMHG | RESPIRATION RATE: 20 BRPM | OXYGEN SATURATION: 91 % | DIASTOLIC BLOOD PRESSURE: 81 MMHG | HEART RATE: 121 BPM

## 2021-02-26 VITALS — SYSTOLIC BLOOD PRESSURE: 154 MMHG | DIASTOLIC BLOOD PRESSURE: 90 MMHG | OXYGEN SATURATION: 96 % | HEART RATE: 113 BPM

## 2021-02-26 VITALS
DIASTOLIC BLOOD PRESSURE: 50 MMHG | RESPIRATION RATE: 18 BRPM | OXYGEN SATURATION: 95 % | SYSTOLIC BLOOD PRESSURE: 97 MMHG | HEART RATE: 94 BPM

## 2021-02-26 VITALS
HEART RATE: 112 BPM | RESPIRATION RATE: 22 BRPM | DIASTOLIC BLOOD PRESSURE: 50 MMHG | OXYGEN SATURATION: 97 % | SYSTOLIC BLOOD PRESSURE: 70 MMHG

## 2021-02-26 VITALS — HEART RATE: 97 BPM | OXYGEN SATURATION: 94 % | SYSTOLIC BLOOD PRESSURE: 110 MMHG | DIASTOLIC BLOOD PRESSURE: 48 MMHG

## 2021-02-26 VITALS — DIASTOLIC BLOOD PRESSURE: 32 MMHG | SYSTOLIC BLOOD PRESSURE: 96 MMHG | HEART RATE: 101 BPM | OXYGEN SATURATION: 93 %

## 2021-02-26 VITALS
SYSTOLIC BLOOD PRESSURE: 162 MMHG | RESPIRATION RATE: 20 BRPM | OXYGEN SATURATION: 96 % | DIASTOLIC BLOOD PRESSURE: 86 MMHG | HEART RATE: 115 BPM

## 2021-02-26 VITALS
OXYGEN SATURATION: 97 % | SYSTOLIC BLOOD PRESSURE: 180 MMHG | DIASTOLIC BLOOD PRESSURE: 87 MMHG | RESPIRATION RATE: 22 BRPM | HEART RATE: 109 BPM

## 2021-02-26 VITALS
OXYGEN SATURATION: 97 % | SYSTOLIC BLOOD PRESSURE: 105 MMHG | DIASTOLIC BLOOD PRESSURE: 52 MMHG | RESPIRATION RATE: 17 BRPM | TEMPERATURE: 99.32 F | HEART RATE: 100 BPM

## 2021-02-26 VITALS
HEART RATE: 92 BPM | OXYGEN SATURATION: 94 % | RESPIRATION RATE: 22 BRPM | SYSTOLIC BLOOD PRESSURE: 117 MMHG | DIASTOLIC BLOOD PRESSURE: 81 MMHG

## 2021-02-26 VITALS
DIASTOLIC BLOOD PRESSURE: 82 MMHG | OXYGEN SATURATION: 96 % | SYSTOLIC BLOOD PRESSURE: 159 MMHG | RESPIRATION RATE: 22 BRPM | HEART RATE: 117 BPM

## 2021-02-26 VITALS
OXYGEN SATURATION: 94 % | DIASTOLIC BLOOD PRESSURE: 47 MMHG | RESPIRATION RATE: 22 BRPM | SYSTOLIC BLOOD PRESSURE: 92 MMHG | HEART RATE: 93 BPM

## 2021-02-26 VITALS — SYSTOLIC BLOOD PRESSURE: 75 MMHG | DIASTOLIC BLOOD PRESSURE: 48 MMHG | OXYGEN SATURATION: 92 %

## 2021-02-26 VITALS
RESPIRATION RATE: 19 BRPM | HEART RATE: 118 BPM | SYSTOLIC BLOOD PRESSURE: 165 MMHG | TEMPERATURE: 98.96 F | DIASTOLIC BLOOD PRESSURE: 84 MMHG | OXYGEN SATURATION: 97 %

## 2021-02-26 VITALS
HEART RATE: 115 BPM | DIASTOLIC BLOOD PRESSURE: 85 MMHG | SYSTOLIC BLOOD PRESSURE: 126 MMHG | RESPIRATION RATE: 20 BRPM | OXYGEN SATURATION: 96 %

## 2021-02-26 VITALS
HEART RATE: 108 BPM | RESPIRATION RATE: 19 BRPM | DIASTOLIC BLOOD PRESSURE: 83 MMHG | SYSTOLIC BLOOD PRESSURE: 166 MMHG | OXYGEN SATURATION: 97 %

## 2021-02-26 VITALS
TEMPERATURE: 99.6 F | RESPIRATION RATE: 22 BRPM | OXYGEN SATURATION: 95 % | SYSTOLIC BLOOD PRESSURE: 90 MMHG | HEART RATE: 101 BPM | DIASTOLIC BLOOD PRESSURE: 46 MMHG

## 2021-02-26 VITALS
OXYGEN SATURATION: 96 % | RESPIRATION RATE: 18 BRPM | DIASTOLIC BLOOD PRESSURE: 88 MMHG | SYSTOLIC BLOOD PRESSURE: 160 MMHG | TEMPERATURE: 99 F | HEART RATE: 114 BPM

## 2021-02-26 VITALS
OXYGEN SATURATION: 96 % | RESPIRATION RATE: 22 BRPM | SYSTOLIC BLOOD PRESSURE: 160 MMHG | DIASTOLIC BLOOD PRESSURE: 84 MMHG | HEART RATE: 115 BPM

## 2021-02-26 VITALS — DIASTOLIC BLOOD PRESSURE: 78 MMHG | OXYGEN SATURATION: 95 % | HEART RATE: 115 BPM | SYSTOLIC BLOOD PRESSURE: 151 MMHG

## 2021-02-26 VITALS — SYSTOLIC BLOOD PRESSURE: 173 MMHG | HEART RATE: 114 BPM | OXYGEN SATURATION: 96 % | DIASTOLIC BLOOD PRESSURE: 90 MMHG

## 2021-02-26 VITALS
RESPIRATION RATE: 22 BRPM | HEART RATE: 102 BPM | DIASTOLIC BLOOD PRESSURE: 31 MMHG | OXYGEN SATURATION: 90 % | SYSTOLIC BLOOD PRESSURE: 89 MMHG

## 2021-02-26 VITALS
SYSTOLIC BLOOD PRESSURE: 92 MMHG | OXYGEN SATURATION: 95 % | DIASTOLIC BLOOD PRESSURE: 47 MMHG | RESPIRATION RATE: 22 BRPM | HEART RATE: 112 BPM

## 2021-02-26 VITALS
SYSTOLIC BLOOD PRESSURE: 110 MMHG | OXYGEN SATURATION: 95 % | HEART RATE: 94 BPM | RESPIRATION RATE: 20 BRPM | DIASTOLIC BLOOD PRESSURE: 44 MMHG

## 2021-02-26 VITALS — OXYGEN SATURATION: 93 % | HEART RATE: 95 BPM | SYSTOLIC BLOOD PRESSURE: 103 MMHG | DIASTOLIC BLOOD PRESSURE: 47 MMHG

## 2021-02-26 VITALS — SYSTOLIC BLOOD PRESSURE: 103 MMHG | DIASTOLIC BLOOD PRESSURE: 48 MMHG | HEART RATE: 103 BPM | OXYGEN SATURATION: 97 %

## 2021-02-26 VITALS
DIASTOLIC BLOOD PRESSURE: 43 MMHG | RESPIRATION RATE: 17 BRPM | HEART RATE: 104 BPM | OXYGEN SATURATION: 98 % | SYSTOLIC BLOOD PRESSURE: 96 MMHG

## 2021-02-26 VITALS — DIASTOLIC BLOOD PRESSURE: 45 MMHG | OXYGEN SATURATION: 97 % | HEART RATE: 97 BPM | SYSTOLIC BLOOD PRESSURE: 114 MMHG

## 2021-02-26 VITALS
OXYGEN SATURATION: 95 % | RESPIRATION RATE: 18 BRPM | HEART RATE: 114 BPM | SYSTOLIC BLOOD PRESSURE: 163 MMHG | DIASTOLIC BLOOD PRESSURE: 83 MMHG

## 2021-02-26 VITALS
SYSTOLIC BLOOD PRESSURE: 157 MMHG | HEART RATE: 121 BPM | OXYGEN SATURATION: 94 % | RESPIRATION RATE: 20 BRPM | DIASTOLIC BLOOD PRESSURE: 81 MMHG

## 2021-02-26 VITALS — SYSTOLIC BLOOD PRESSURE: 89 MMHG | HEART RATE: 97 BPM | DIASTOLIC BLOOD PRESSURE: 45 MMHG | OXYGEN SATURATION: 93 %

## 2021-02-26 VITALS — DIASTOLIC BLOOD PRESSURE: 43 MMHG | HEART RATE: 94 BPM | SYSTOLIC BLOOD PRESSURE: 91 MMHG | OXYGEN SATURATION: 91 %

## 2021-02-26 VITALS — DIASTOLIC BLOOD PRESSURE: 57 MMHG | HEART RATE: 96 BPM | OXYGEN SATURATION: 92 % | SYSTOLIC BLOOD PRESSURE: 107 MMHG

## 2021-02-26 VITALS
RESPIRATION RATE: 17 BRPM | DIASTOLIC BLOOD PRESSURE: 47 MMHG | SYSTOLIC BLOOD PRESSURE: 104 MMHG | HEART RATE: 95 BPM | OXYGEN SATURATION: 92 %

## 2021-02-26 VITALS
DIASTOLIC BLOOD PRESSURE: 64 MMHG | OXYGEN SATURATION: 92 % | SYSTOLIC BLOOD PRESSURE: 154 MMHG | HEART RATE: 121 BPM | RESPIRATION RATE: 20 BRPM

## 2021-02-26 VITALS — SYSTOLIC BLOOD PRESSURE: 97 MMHG | OXYGEN SATURATION: 95 % | DIASTOLIC BLOOD PRESSURE: 44 MMHG | HEART RATE: 96 BPM

## 2021-02-26 VITALS
OXYGEN SATURATION: 93 % | RESPIRATION RATE: 23 BRPM | SYSTOLIC BLOOD PRESSURE: 107 MMHG | HEART RATE: 98 BPM | DIASTOLIC BLOOD PRESSURE: 46 MMHG

## 2021-02-26 VITALS
OXYGEN SATURATION: 95 % | HEART RATE: 116 BPM | RESPIRATION RATE: 22 BRPM | SYSTOLIC BLOOD PRESSURE: 159 MMHG | DIASTOLIC BLOOD PRESSURE: 85 MMHG

## 2021-02-26 VITALS — SYSTOLIC BLOOD PRESSURE: 151 MMHG | DIASTOLIC BLOOD PRESSURE: 88 MMHG | HEART RATE: 114 BPM | OXYGEN SATURATION: 96 %

## 2021-02-26 VITALS — HEART RATE: 109 BPM | OXYGEN SATURATION: 92 % | SYSTOLIC BLOOD PRESSURE: 90 MMHG | DIASTOLIC BLOOD PRESSURE: 46 MMHG

## 2021-02-26 VITALS — HEART RATE: 111 BPM | DIASTOLIC BLOOD PRESSURE: 93 MMHG | SYSTOLIC BLOOD PRESSURE: 115 MMHG | OXYGEN SATURATION: 94 %

## 2021-02-26 VITALS
HEART RATE: 114 BPM | OXYGEN SATURATION: 97 % | RESPIRATION RATE: 22 BRPM | DIASTOLIC BLOOD PRESSURE: 86 MMHG | SYSTOLIC BLOOD PRESSURE: 167 MMHG

## 2021-02-26 VITALS — HEART RATE: 113 BPM | OXYGEN SATURATION: 97 %

## 2021-02-26 VITALS
HEART RATE: 114 BPM | SYSTOLIC BLOOD PRESSURE: 145 MMHG | DIASTOLIC BLOOD PRESSURE: 87 MMHG | OXYGEN SATURATION: 97 % | RESPIRATION RATE: 22 BRPM

## 2021-02-26 VITALS — HEART RATE: 103 BPM | OXYGEN SATURATION: 92 % | SYSTOLIC BLOOD PRESSURE: 94 MMHG | DIASTOLIC BLOOD PRESSURE: 50 MMHG

## 2021-02-26 VITALS
SYSTOLIC BLOOD PRESSURE: 104 MMHG | HEART RATE: 94 BPM | RESPIRATION RATE: 18 BRPM | OXYGEN SATURATION: 95 % | DIASTOLIC BLOOD PRESSURE: 46 MMHG

## 2021-02-26 LAB
ANION GAP SERPL CALC-SCNC: 10.2 MEQ/L (ref 5–15)
APTT PPP: 122.8 SECONDS (ref 23.6–34)
APTT PPP: 275 SECONDS (ref 23.6–34)
APTT PPP: 46.7 SECONDS (ref 23.6–34)
APTT PPP: 59.7 SECONDS (ref 23.6–34)
APTT PPP: 59.9 SECONDS (ref 23.6–34)
APTT PPP: 62.3 SECONDS (ref 23.6–34)
APTT PPP: 63.4 SECONDS (ref 23.6–34)
APTT PPP: 98.8 SECONDS (ref 23.6–34)
BUN SERPL-MCNC: 11 MG/DL (ref 9–20)
CALCIUM SPEC-MCNC: 8.4 MG/DL (ref 8.4–10.2)
CATHL ACTIVATED CLOTTING TIME: 375 SEC (ref 74–125)
CATHL ACTIVATED CLOTTING TIME: > 400 SEC (ref 74–125)
CATHL ACTIVATED CLOTTING TIME: > 400 SEC (ref 74–125)
CELLS COUNTED: 100
CHLORIDE SPEC-SCNC: 102 MMOL/L (ref 98–107)
CO2 SERPL-SCNC: 25 MMOL/L (ref 22–30)
CREAT BLD-SCNC: 0.8 MG/DL (ref 0.66–1.25)
CREATININE CLEARANCE ESTIMATED: 119 ML/MIN (ref 50–200)
ESTIMATED GLOMERULAR FILT RATE: 98 ML/MIN (ref 60–?)
GFR (AFRICAN AMERICAN): 119 ML/MIN (ref 60–?)
GLUCOSE BLDC GLUCOMTR-MCNC: 184 MG/DL (ref 70–110)
GLUCOSE BLDC GLUCOMTR-MCNC: 250 MG/DL (ref 70–110)
GLUCOSE BLDC GLUCOMTR-MCNC: 262 MG/DL (ref 70–110)
GLUCOSE BLDC GLUCOMTR-MCNC: 278 MG/DL (ref 70–110)
GLUCOSE: 270 MG/DL (ref 74–100)
HCT VFR BLD CALC: 39.5 % (ref 42–52)
HCT VFR BLD CALC: 42 % (ref 42–52)
HGB BLD-MCNC: 12.9 G/DL (ref 14.1–18)
HGB BLD-MCNC: 13 G/DL (ref 14.1–18)
INR PPP: 1.56 (ref 0.9–1.1)
MANUAL DIFFERENTIAL: (no result)
MCHC RBC-ENTMCNC: 31 G/DL (ref 31.8–35.4)
MCHC RBC-ENTMCNC: 32.6 G/DL (ref 31.8–35.4)
MCV RBC: 93.8 FL (ref 80–94)
MCV RBC: 97.2 FL (ref 80–94)
MEAN CORPUSCULAR HEMOGLOBIN: 30.1 PG (ref 27–31.2)
MEAN CORPUSCULAR HEMOGLOBIN: 30.6 PG (ref 27–31.2)
PLATELET # BLD: 227 K/MM3 (ref 142–424)
PLATELET # BLD: 343 K/MM3 (ref 142–424)
POTASSIUM: 4.2 MMOL/L (ref 3.5–5.1)
PT BLD: 16.6 SECONDS (ref 9.4–11.8)
RBC # BLD AUTO: 4.21 M/MM3 (ref 4.6–6.2)
RBC # BLD AUTO: 4.32 M/MM3 (ref 4.6–6.2)
SODIUM SPEC-SCNC: 133 MMOL/L (ref 136–145)
WBC # BLD AUTO: 11.4 K/MM3 (ref 4.8–10.8)
WBC # BLD AUTO: 19.9 K/MM3 (ref 4.8–10.8)

## 2021-02-26 PROCEDURE — 047K34Z DILATION OF RIGHT FEMORAL ARTERY WITH DRUG-ELUTING INTRALUMINAL DEVICE, PERCUTANEOUS APPROACH: ICD-10-PCS | Performed by: INTERNAL MEDICINE

## 2021-02-26 NOTE — PC.NURSE
Pt is alert and oriented x4. Audible wheezes noted at times. He is currently on 2L NC with O2 sats in the upper 90's. He has bruising to bilateral groin, left greater than right. RLQ of abdomen is mottled all the way down through RLE. Lateral side

## 2021-02-26 NOTE — PC.NURSE
Spoke with [facility] @ 8330. No changes to Heparin gtt. Repeat PTT in 4 hrs. Heparin gtt currently infusing @ 1800 units/hr. No changes to pulses. Tibial pulse obtained per doppler to LLE. Popliteal pulse palpable to RLE. 0 pedal and tibial.

## 2021-02-26 NOTE — PC.NURSE
MD notified of  RLE becoming more dusky and mottled this AM. Pedal pulses have not returned. Tibial pulse obtained in LLE per doppler. LLE warm. RLE popliteal pulse obtained. No pedal or tibial. RLE is cool to touch up to calf. Pt has c/o aching and

## 2021-02-26 NOTE — PC.NURSE
1355 - Manual BP low (see intervention); Pt pale and diaphoretic, stated he was light headed and felt like he was going to pass out. He reported back pain, which he said is chronic but was worse now. Dr Koroma notified in the cath lab. He stated

## 2021-02-27 VITALS
DIASTOLIC BLOOD PRESSURE: 38 MMHG | HEART RATE: 120 BPM | SYSTOLIC BLOOD PRESSURE: 118 MMHG | OXYGEN SATURATION: 91 % | RESPIRATION RATE: 25 BRPM

## 2021-02-27 VITALS
RESPIRATION RATE: 31 BRPM | OXYGEN SATURATION: 89 % | DIASTOLIC BLOOD PRESSURE: 54 MMHG | SYSTOLIC BLOOD PRESSURE: 115 MMHG | HEART RATE: 122 BPM

## 2021-02-27 VITALS
DIASTOLIC BLOOD PRESSURE: 55 MMHG | TEMPERATURE: 99.1 F | RESPIRATION RATE: 31 BRPM | OXYGEN SATURATION: 91 % | SYSTOLIC BLOOD PRESSURE: 119 MMHG | HEART RATE: 112 BPM

## 2021-02-27 VITALS — OXYGEN SATURATION: 89 % | DIASTOLIC BLOOD PRESSURE: 53 MMHG | SYSTOLIC BLOOD PRESSURE: 90 MMHG | HEART RATE: 98 BPM

## 2021-02-27 VITALS
DIASTOLIC BLOOD PRESSURE: 45 MMHG | SYSTOLIC BLOOD PRESSURE: 89 MMHG | RESPIRATION RATE: 22 BRPM | OXYGEN SATURATION: 92 % | HEART RATE: 111 BPM

## 2021-02-27 VITALS
DIASTOLIC BLOOD PRESSURE: 52 MMHG | OXYGEN SATURATION: 95 % | TEMPERATURE: 99.6 F | HEART RATE: 110 BPM | SYSTOLIC BLOOD PRESSURE: 89 MMHG | RESPIRATION RATE: 22 BRPM

## 2021-02-27 VITALS
TEMPERATURE: 98.8 F | RESPIRATION RATE: 20 BRPM | HEART RATE: 96 BPM | DIASTOLIC BLOOD PRESSURE: 48 MMHG | OXYGEN SATURATION: 89 % | SYSTOLIC BLOOD PRESSURE: 101 MMHG

## 2021-02-27 VITALS
OXYGEN SATURATION: 92 % | DIASTOLIC BLOOD PRESSURE: 39 MMHG | SYSTOLIC BLOOD PRESSURE: 76 MMHG | RESPIRATION RATE: 31 BRPM | HEART RATE: 119 BPM

## 2021-02-27 VITALS
OXYGEN SATURATION: 90 % | RESPIRATION RATE: 24 BRPM | DIASTOLIC BLOOD PRESSURE: 51 MMHG | HEART RATE: 120 BPM | SYSTOLIC BLOOD PRESSURE: 112 MMHG

## 2021-02-27 VITALS
RESPIRATION RATE: 28 BRPM | DIASTOLIC BLOOD PRESSURE: 39 MMHG | SYSTOLIC BLOOD PRESSURE: 92 MMHG | OXYGEN SATURATION: 94 % | HEART RATE: 120 BPM

## 2021-02-27 VITALS — OXYGEN SATURATION: 86 % | SYSTOLIC BLOOD PRESSURE: 98 MMHG | DIASTOLIC BLOOD PRESSURE: 48 MMHG | HEART RATE: 100 BPM

## 2021-02-27 VITALS — SYSTOLIC BLOOD PRESSURE: 119 MMHG | HEART RATE: 111 BPM | DIASTOLIC BLOOD PRESSURE: 55 MMHG | OXYGEN SATURATION: 92 %

## 2021-02-27 VITALS
HEART RATE: 108 BPM | OXYGEN SATURATION: 94 % | RESPIRATION RATE: 25 BRPM | DIASTOLIC BLOOD PRESSURE: 58 MMHG | SYSTOLIC BLOOD PRESSURE: 91 MMHG

## 2021-02-27 VITALS — HEART RATE: 95 BPM | OXYGEN SATURATION: 92 % | DIASTOLIC BLOOD PRESSURE: 49 MMHG | SYSTOLIC BLOOD PRESSURE: 108 MMHG

## 2021-02-27 VITALS
TEMPERATURE: 98.7 F | DIASTOLIC BLOOD PRESSURE: 45 MMHG | SYSTOLIC BLOOD PRESSURE: 106 MMHG | OXYGEN SATURATION: 91 % | HEART RATE: 106 BPM

## 2021-02-27 VITALS — HEART RATE: 95 BPM | DIASTOLIC BLOOD PRESSURE: 41 MMHG | OXYGEN SATURATION: 90 % | SYSTOLIC BLOOD PRESSURE: 101 MMHG

## 2021-02-27 VITALS — HEART RATE: 96 BPM | DIASTOLIC BLOOD PRESSURE: 46 MMHG | OXYGEN SATURATION: 91 % | SYSTOLIC BLOOD PRESSURE: 116 MMHG

## 2021-02-27 VITALS
HEART RATE: 123 BPM | DIASTOLIC BLOOD PRESSURE: 49 MMHG | OXYGEN SATURATION: 86 % | SYSTOLIC BLOOD PRESSURE: 100 MMHG | RESPIRATION RATE: 37 BRPM

## 2021-02-27 VITALS
SYSTOLIC BLOOD PRESSURE: 88 MMHG | DIASTOLIC BLOOD PRESSURE: 52 MMHG | HEART RATE: 126 BPM | RESPIRATION RATE: 35 BRPM | OXYGEN SATURATION: 86 %

## 2021-02-27 VITALS
SYSTOLIC BLOOD PRESSURE: 73 MMHG | TEMPERATURE: 99.32 F | RESPIRATION RATE: 33 BRPM | HEART RATE: 120 BPM | DIASTOLIC BLOOD PRESSURE: 50 MMHG | OXYGEN SATURATION: 78 %

## 2021-02-27 VITALS
OXYGEN SATURATION: 91 % | HEART RATE: 120 BPM | DIASTOLIC BLOOD PRESSURE: 54 MMHG | RESPIRATION RATE: 24 BRPM | SYSTOLIC BLOOD PRESSURE: 106 MMHG

## 2021-02-27 VITALS — HEART RATE: 94 BPM | SYSTOLIC BLOOD PRESSURE: 93 MMHG | OXYGEN SATURATION: 92 % | DIASTOLIC BLOOD PRESSURE: 46 MMHG

## 2021-02-27 VITALS
RESPIRATION RATE: 25 BRPM | HEART RATE: 125 BPM | DIASTOLIC BLOOD PRESSURE: 33 MMHG | SYSTOLIC BLOOD PRESSURE: 71 MMHG | OXYGEN SATURATION: 91 %

## 2021-02-27 VITALS — HEART RATE: 99 BPM | DIASTOLIC BLOOD PRESSURE: 43 MMHG | SYSTOLIC BLOOD PRESSURE: 109 MMHG | OXYGEN SATURATION: 88 %

## 2021-02-27 VITALS
RESPIRATION RATE: 24 BRPM | HEART RATE: 109 BPM | SYSTOLIC BLOOD PRESSURE: 98 MMHG | DIASTOLIC BLOOD PRESSURE: 44 MMHG | OXYGEN SATURATION: 93 %

## 2021-02-27 VITALS
OXYGEN SATURATION: 92 % | SYSTOLIC BLOOD PRESSURE: 96 MMHG | RESPIRATION RATE: 23 BRPM | HEART RATE: 116 BPM | DIASTOLIC BLOOD PRESSURE: 40 MMHG

## 2021-02-27 VITALS — OXYGEN SATURATION: 89 % | DIASTOLIC BLOOD PRESSURE: 46 MMHG | HEART RATE: 101 BPM | SYSTOLIC BLOOD PRESSURE: 100 MMHG

## 2021-02-27 VITALS
HEART RATE: 120 BPM | RESPIRATION RATE: 26 BRPM | SYSTOLIC BLOOD PRESSURE: 113 MMHG | OXYGEN SATURATION: 93 % | DIASTOLIC BLOOD PRESSURE: 46 MMHG

## 2021-02-27 VITALS
RESPIRATION RATE: 21 BRPM | DIASTOLIC BLOOD PRESSURE: 45 MMHG | HEART RATE: 108 BPM | SYSTOLIC BLOOD PRESSURE: 96 MMHG | OXYGEN SATURATION: 92 %

## 2021-02-27 VITALS
OXYGEN SATURATION: 93 % | SYSTOLIC BLOOD PRESSURE: 84 MMHG | HEART RATE: 115 BPM | RESPIRATION RATE: 22 BRPM | DIASTOLIC BLOOD PRESSURE: 45 MMHG

## 2021-02-27 VITALS
OXYGEN SATURATION: 94 % | HEART RATE: 117 BPM | RESPIRATION RATE: 26 BRPM | TEMPERATURE: 99.1 F | SYSTOLIC BLOOD PRESSURE: 178 MMHG | DIASTOLIC BLOOD PRESSURE: 97 MMHG

## 2021-02-27 VITALS — OXYGEN SATURATION: 88 % | SYSTOLIC BLOOD PRESSURE: 101 MMHG | DIASTOLIC BLOOD PRESSURE: 42 MMHG | HEART RATE: 100 BPM

## 2021-02-27 VITALS — OXYGEN SATURATION: 92 % | HEART RATE: 100 BPM | DIASTOLIC BLOOD PRESSURE: 41 MMHG | SYSTOLIC BLOOD PRESSURE: 86 MMHG

## 2021-02-27 VITALS — OXYGEN SATURATION: 96 % | HEART RATE: 96 BPM

## 2021-02-27 VITALS
OXYGEN SATURATION: 92 % | HEART RATE: 126 BPM | DIASTOLIC BLOOD PRESSURE: 48 MMHG | RESPIRATION RATE: 28 BRPM | SYSTOLIC BLOOD PRESSURE: 119 MMHG

## 2021-02-27 VITALS
OXYGEN SATURATION: 94 % | DIASTOLIC BLOOD PRESSURE: 46 MMHG | RESPIRATION RATE: 39 BRPM | SYSTOLIC BLOOD PRESSURE: 101 MMHG | HEART RATE: 113 BPM

## 2021-02-27 VITALS
SYSTOLIC BLOOD PRESSURE: 131 MMHG | DIASTOLIC BLOOD PRESSURE: 53 MMHG | RESPIRATION RATE: 28 BRPM | HEART RATE: 123 BPM | OXYGEN SATURATION: 90 %

## 2021-02-27 VITALS
OXYGEN SATURATION: 94 % | SYSTOLIC BLOOD PRESSURE: 87 MMHG | HEART RATE: 114 BPM | DIASTOLIC BLOOD PRESSURE: 49 MMHG | RESPIRATION RATE: 20 BRPM

## 2021-02-27 VITALS
DIASTOLIC BLOOD PRESSURE: 45 MMHG | HEART RATE: 120 BPM | SYSTOLIC BLOOD PRESSURE: 119 MMHG | OXYGEN SATURATION: 90 % | RESPIRATION RATE: 26 BRPM

## 2021-02-27 VITALS
SYSTOLIC BLOOD PRESSURE: 95 MMHG | OXYGEN SATURATION: 88 % | RESPIRATION RATE: 22 BRPM | DIASTOLIC BLOOD PRESSURE: 51 MMHG | HEART RATE: 95 BPM

## 2021-02-27 VITALS
OXYGEN SATURATION: 90 % | SYSTOLIC BLOOD PRESSURE: 96 MMHG | HEART RATE: 101 BPM | DIASTOLIC BLOOD PRESSURE: 37 MMHG | RESPIRATION RATE: 18 BRPM

## 2021-02-27 VITALS — HEART RATE: 97 BPM | OXYGEN SATURATION: 90 % | SYSTOLIC BLOOD PRESSURE: 99 MMHG | DIASTOLIC BLOOD PRESSURE: 44 MMHG

## 2021-02-27 VITALS — SYSTOLIC BLOOD PRESSURE: 87 MMHG | OXYGEN SATURATION: 85 % | DIASTOLIC BLOOD PRESSURE: 47 MMHG | HEART RATE: 101 BPM

## 2021-02-27 VITALS
RESPIRATION RATE: 25 BRPM | DIASTOLIC BLOOD PRESSURE: 49 MMHG | SYSTOLIC BLOOD PRESSURE: 94 MMHG | OXYGEN SATURATION: 94 % | HEART RATE: 115 BPM

## 2021-02-27 VITALS — DIASTOLIC BLOOD PRESSURE: 49 MMHG | HEART RATE: 95 BPM | OXYGEN SATURATION: 90 % | SYSTOLIC BLOOD PRESSURE: 102 MMHG

## 2021-02-27 VITALS
OXYGEN SATURATION: 93 % | RESPIRATION RATE: 26 BRPM | DIASTOLIC BLOOD PRESSURE: 47 MMHG | SYSTOLIC BLOOD PRESSURE: 85 MMHG | HEART RATE: 116 BPM

## 2021-02-27 VITALS — DIASTOLIC BLOOD PRESSURE: 43 MMHG | SYSTOLIC BLOOD PRESSURE: 91 MMHG | OXYGEN SATURATION: 91 % | HEART RATE: 97 BPM

## 2021-02-27 VITALS
HEART RATE: 118 BPM | RESPIRATION RATE: 23 BRPM | OXYGEN SATURATION: 91 % | SYSTOLIC BLOOD PRESSURE: 98 MMHG | DIASTOLIC BLOOD PRESSURE: 36 MMHG

## 2021-02-27 VITALS
DIASTOLIC BLOOD PRESSURE: 34 MMHG | HEART RATE: 107 BPM | SYSTOLIC BLOOD PRESSURE: 114 MMHG | RESPIRATION RATE: 24 BRPM | OXYGEN SATURATION: 94 %

## 2021-02-27 VITALS
OXYGEN SATURATION: 92 % | SYSTOLIC BLOOD PRESSURE: 105 MMHG | RESPIRATION RATE: 17 BRPM | TEMPERATURE: 98.24 F | HEART RATE: 100 BPM | DIASTOLIC BLOOD PRESSURE: 44 MMHG

## 2021-02-27 VITALS — SYSTOLIC BLOOD PRESSURE: 113 MMHG | OXYGEN SATURATION: 97 % | DIASTOLIC BLOOD PRESSURE: 52 MMHG | HEART RATE: 122 BPM

## 2021-02-27 VITALS
OXYGEN SATURATION: 90 % | HEART RATE: 119 BPM | DIASTOLIC BLOOD PRESSURE: 46 MMHG | RESPIRATION RATE: 36 BRPM | SYSTOLIC BLOOD PRESSURE: 93 MMHG

## 2021-02-27 VITALS — HEART RATE: 99 BPM | OXYGEN SATURATION: 92 % | DIASTOLIC BLOOD PRESSURE: 49 MMHG | SYSTOLIC BLOOD PRESSURE: 90 MMHG

## 2021-02-27 VITALS — HEART RATE: 104 BPM | OXYGEN SATURATION: 90 % | SYSTOLIC BLOOD PRESSURE: 97 MMHG | DIASTOLIC BLOOD PRESSURE: 40 MMHG

## 2021-02-27 VITALS — DIASTOLIC BLOOD PRESSURE: 39 MMHG | OXYGEN SATURATION: 90 % | HEART RATE: 101 BPM | SYSTOLIC BLOOD PRESSURE: 76 MMHG

## 2021-02-27 VITALS
RESPIRATION RATE: 27 BRPM | SYSTOLIC BLOOD PRESSURE: 91 MMHG | HEART RATE: 87 BPM | DIASTOLIC BLOOD PRESSURE: 39 MMHG | OXYGEN SATURATION: 92 %

## 2021-02-27 VITALS
OXYGEN SATURATION: 95 % | RESPIRATION RATE: 17 BRPM | DIASTOLIC BLOOD PRESSURE: 48 MMHG | TEMPERATURE: 98.7 F | SYSTOLIC BLOOD PRESSURE: 94 MMHG | HEART RATE: 102 BPM

## 2021-02-27 VITALS — DIASTOLIC BLOOD PRESSURE: 50 MMHG | SYSTOLIC BLOOD PRESSURE: 105 MMHG | HEART RATE: 98 BPM | OXYGEN SATURATION: 91 %

## 2021-02-27 VITALS
HEART RATE: 112 BPM | OXYGEN SATURATION: 93 % | SYSTOLIC BLOOD PRESSURE: 106 MMHG | DIASTOLIC BLOOD PRESSURE: 47 MMHG | RESPIRATION RATE: 37 BRPM

## 2021-02-27 VITALS
RESPIRATION RATE: 22 BRPM | SYSTOLIC BLOOD PRESSURE: 90 MMHG | OXYGEN SATURATION: 90 % | HEART RATE: 121 BPM | DIASTOLIC BLOOD PRESSURE: 54 MMHG

## 2021-02-27 VITALS — HEART RATE: 113 BPM | OXYGEN SATURATION: 100 % | DIASTOLIC BLOOD PRESSURE: 94 MMHG | SYSTOLIC BLOOD PRESSURE: 128 MMHG

## 2021-02-27 VITALS — HEART RATE: 94 BPM | OXYGEN SATURATION: 92 % | DIASTOLIC BLOOD PRESSURE: 45 MMHG | SYSTOLIC BLOOD PRESSURE: 104 MMHG

## 2021-02-27 VITALS — DIASTOLIC BLOOD PRESSURE: 44 MMHG | HEART RATE: 97 BPM | OXYGEN SATURATION: 90 % | SYSTOLIC BLOOD PRESSURE: 101 MMHG

## 2021-02-27 VITALS
RESPIRATION RATE: 36 BRPM | DIASTOLIC BLOOD PRESSURE: 39 MMHG | OXYGEN SATURATION: 90 % | HEART RATE: 122 BPM | SYSTOLIC BLOOD PRESSURE: 97 MMHG

## 2021-02-27 VITALS — SYSTOLIC BLOOD PRESSURE: 97 MMHG | HEART RATE: 95 BPM | DIASTOLIC BLOOD PRESSURE: 46 MMHG | OXYGEN SATURATION: 89 %

## 2021-02-27 LAB
ALBUMIN LEVEL: 3.4 G/DL (ref 3.5–5)
ALP ISO SERPL-ACNC: 58 U/L (ref 38–126)
ALT SERPLBLD-CCNC: 32 U/L (ref 12–78)
ANION GAP SERPL CALC-SCNC: 12.7 MEQ/L (ref 5–15)
APTT PPP: 49.5 SECONDS (ref 23.6–34)
APTT PPP: 76.7 SECONDS (ref 23.6–34)
AST SERPL QL: 94 U/L (ref 17–59)
BILIRUB DIRECT SERPL-MCNC: 0.5 MG/DL (ref 0–0.4)
BILIRUB INDIRECT SERPL-MCNC: 0.7 MG/DL (ref 0–0.9)
BILIRUB INDIRECT SERPL-MCNC: 0.8 MG/DL (ref 0–1.1)
BILIRUBIN,TOTAL: 1.2 MG/DL (ref 0.2–1.3)
BUN SERPL-MCNC: 18 MG/DL (ref 9–20)
CALCIUM SPEC-MCNC: 7.9 MG/DL (ref 8.4–10.2)
CELLS COUNTED: 100
CHLORIDE SPEC-SCNC: 99 MMOL/L (ref 98–107)
CO2 SERPL-SCNC: 23 MMOL/L (ref 22–30)
CREAT BLD-SCNC: 2.7 MG/DL (ref 0.66–1.25)
CREATININE CLEARANCE ESTIMATED: 44 ML/MIN (ref 50–200)
ESTIMATED GLOMERULAR FILT RATE: 24 ML/MIN (ref 60–?)
GFR (AFRICAN AMERICAN): 29 ML/MIN (ref 60–?)
GLUCOSE BLDC GLUCOMTR-MCNC: 300 MG/DL (ref 70–110)
GLUCOSE BLDC GLUCOMTR-MCNC: 330 MG/DL (ref 70–110)
GLUCOSE BLDC GLUCOMTR-MCNC: 396 MG/DL (ref 70–110)
GLUCOSE BLDC GLUCOMTR-MCNC: 412 MG/DL (ref 70–110)
GLUCOSE: 297 MG/DL (ref 74–100)
HCT VFR BLD CALC: 36.7 % (ref 42–52)
HGB BLD-MCNC: 12 G/DL (ref 14.1–18)
MANUAL DIFFERENTIAL: (no result)
MCHC RBC-ENTMCNC: 32.8 G/DL (ref 31.8–35.4)
MCV RBC: 94.4 FL (ref 80–94)
MEAN CORPUSCULAR HEMOGLOBIN: 30.9 PG (ref 27–31.2)
PLATELET # BLD: 232 K/MM3 (ref 142–424)
POTASSIUM: 5.7 MMOL/L (ref 3.5–5.1)
PROT SERPL-MCNC: 6.7 G/DL (ref 6.3–8.2)
RBC # BLD AUTO: 3.89 M/MM3 (ref 4.6–6.2)
SODIUM SPEC-SCNC: 129 MMOL/L (ref 136–145)
WBC # BLD AUTO: 18.1 K/MM3 (ref 4.8–10.8)

## 2021-02-27 NOTE — PC.NURSE
received new orders per Dr. Koroma:  increase NS to 200m/hr, keep ATP OFF, wean Levophed to keep SBP>100, CXR r/t increased WBC, increase HOB to 45 degrees, and to give scheduled meds (ASA, Plavix, and Coumadin).  Lab and RNs have been attempting

## 2021-02-27 NOTE — PC.NURSE
shift note:  Hypotension persists.  Levo gtt currently @ 25mcg/min.  Current BP 98/44.  Heparin gtt currently @ 1900 units/hr.  Unable to obtain PTT today until Rm (lab) arrived @ 1800.  NS infusing @ 200mL/hr.  Tachycardia continues with HR 110s.

## 2021-02-27 NOTE — PC.NURSE
received call from Dr. Jaime.  He ordered to repeat PTT in 6hrs (1500) and Ativan 0.5mg IV q4hrs prn anxiety.  Called lab ([facility]) and pharmacy ([facility] and updated them.  Faxed order for Ativan to pharmacy.

## 2021-02-27 NOTE — PC.NURSE
Multiple attempts to draw blood for PTT due @ 1500 by several RNs and Jessica in lab.  All unsuccessful.  Notified Dr. Jaime in ED.  No new orders at this time.

## 2021-02-27 NOTE — PC.NURSE
He is alert to person, name, birthday, and place but he stated 2002 when he was asked the year.  He has been laying supine t/o the shift.  He is receiving frequent PRN pain medications r/t back pain.  He moans out frequently but he stated that was

## 2021-02-27 NOTE — PC.NURSE
Received call from [facility].  He ordered to repeat PTT, CBC, and CMP per Dr. Jaime @ 0600.  Orders entered for Dr. Jaime.  PTT is therapeutic.  No change in Heparin gtt @ this time.

## 2021-02-27 NOTE — PC.NURSE
contacted Dr. Jaime, who is in ED today, about not being able to get blood drawn for 0900 PTT as pt is on Heparin gtt.  Pt has been stuck numerous times by multiple RNs and lab techs in the last 1.5hrs.  Dr. Jaime called Dr. Koroma, who ordered

## 2021-02-28 VITALS
OXYGEN SATURATION: 96 % | SYSTOLIC BLOOD PRESSURE: 98 MMHG | DIASTOLIC BLOOD PRESSURE: 50 MMHG | HEART RATE: 110 BPM | RESPIRATION RATE: 15 BRPM

## 2021-02-28 VITALS
HEART RATE: 114 BPM | DIASTOLIC BLOOD PRESSURE: 52 MMHG | TEMPERATURE: 99.68 F | SYSTOLIC BLOOD PRESSURE: 106 MMHG | OXYGEN SATURATION: 94 % | RESPIRATION RATE: 33 BRPM

## 2021-02-28 VITALS — SYSTOLIC BLOOD PRESSURE: 114 MMHG | DIASTOLIC BLOOD PRESSURE: 50 MMHG | OXYGEN SATURATION: 93 % | HEART RATE: 110 BPM

## 2021-02-28 VITALS
HEART RATE: 104 BPM | SYSTOLIC BLOOD PRESSURE: 114 MMHG | OXYGEN SATURATION: 93 % | RESPIRATION RATE: 18 BRPM | DIASTOLIC BLOOD PRESSURE: 50 MMHG

## 2021-02-28 VITALS — HEART RATE: 114 BPM | DIASTOLIC BLOOD PRESSURE: 57 MMHG | OXYGEN SATURATION: 94 % | SYSTOLIC BLOOD PRESSURE: 129 MMHG

## 2021-02-28 VITALS
OXYGEN SATURATION: 93 % | HEART RATE: 110 BPM | SYSTOLIC BLOOD PRESSURE: 95 MMHG | RESPIRATION RATE: 25 BRPM | DIASTOLIC BLOOD PRESSURE: 51 MMHG

## 2021-02-28 VITALS
RESPIRATION RATE: 26 BRPM | SYSTOLIC BLOOD PRESSURE: 122 MMHG | OXYGEN SATURATION: 95 % | HEART RATE: 109 BPM | DIASTOLIC BLOOD PRESSURE: 58 MMHG

## 2021-02-28 VITALS — HEART RATE: 106 BPM | SYSTOLIC BLOOD PRESSURE: 113 MMHG | DIASTOLIC BLOOD PRESSURE: 55 MMHG

## 2021-02-28 VITALS
SYSTOLIC BLOOD PRESSURE: 118 MMHG | RESPIRATION RATE: 19 BRPM | TEMPERATURE: 99.68 F | OXYGEN SATURATION: 94 % | HEART RATE: 109 BPM | DIASTOLIC BLOOD PRESSURE: 48 MMHG

## 2021-02-28 VITALS
RESPIRATION RATE: 22 BRPM | SYSTOLIC BLOOD PRESSURE: 114 MMHG | HEART RATE: 112 BPM | DIASTOLIC BLOOD PRESSURE: 53 MMHG | OXYGEN SATURATION: 95 %

## 2021-02-28 VITALS
RESPIRATION RATE: 19 BRPM | TEMPERATURE: 98.96 F | HEART RATE: 107 BPM | SYSTOLIC BLOOD PRESSURE: 120 MMHG | DIASTOLIC BLOOD PRESSURE: 59 MMHG | OXYGEN SATURATION: 91 %

## 2021-02-28 VITALS
OXYGEN SATURATION: 93 % | DIASTOLIC BLOOD PRESSURE: 53 MMHG | RESPIRATION RATE: 23 BRPM | HEART RATE: 112 BPM | SYSTOLIC BLOOD PRESSURE: 99 MMHG

## 2021-02-28 VITALS
TEMPERATURE: 100.58 F | RESPIRATION RATE: 23 BRPM | DIASTOLIC BLOOD PRESSURE: 47 MMHG | SYSTOLIC BLOOD PRESSURE: 118 MMHG | HEART RATE: 116 BPM | OXYGEN SATURATION: 93 %

## 2021-02-28 VITALS
HEART RATE: 108 BPM | OXYGEN SATURATION: 96 % | RESPIRATION RATE: 19 BRPM | SYSTOLIC BLOOD PRESSURE: 123 MMHG | DIASTOLIC BLOOD PRESSURE: 57 MMHG

## 2021-02-28 VITALS
OXYGEN SATURATION: 93 % | RESPIRATION RATE: 21 BRPM | HEART RATE: 107 BPM | TEMPERATURE: 98.5 F | DIASTOLIC BLOOD PRESSURE: 51 MMHG | SYSTOLIC BLOOD PRESSURE: 96 MMHG

## 2021-02-28 VITALS — HEART RATE: 110 BPM | OXYGEN SATURATION: 95 % | SYSTOLIC BLOOD PRESSURE: 130 MMHG | DIASTOLIC BLOOD PRESSURE: 51 MMHG

## 2021-02-28 VITALS
DIASTOLIC BLOOD PRESSURE: 57 MMHG | HEART RATE: 109 BPM | RESPIRATION RATE: 20 BRPM | OXYGEN SATURATION: 91 % | TEMPERATURE: 100.04 F | SYSTOLIC BLOOD PRESSURE: 110 MMHG

## 2021-02-28 VITALS
OXYGEN SATURATION: 93 % | HEART RATE: 112 BPM | TEMPERATURE: 100.7 F | RESPIRATION RATE: 17 BRPM | DIASTOLIC BLOOD PRESSURE: 58 MMHG | SYSTOLIC BLOOD PRESSURE: 128 MMHG

## 2021-02-28 VITALS — DIASTOLIC BLOOD PRESSURE: 56 MMHG | HEART RATE: 110 BPM | SYSTOLIC BLOOD PRESSURE: 114 MMHG | OXYGEN SATURATION: 92 %

## 2021-02-28 VITALS
SYSTOLIC BLOOD PRESSURE: 92 MMHG | DIASTOLIC BLOOD PRESSURE: 48 MMHG | RESPIRATION RATE: 27 BRPM | OXYGEN SATURATION: 91 % | HEART RATE: 112 BPM

## 2021-02-28 VITALS
HEART RATE: 115 BPM | SYSTOLIC BLOOD PRESSURE: 112 MMHG | OXYGEN SATURATION: 94 % | DIASTOLIC BLOOD PRESSURE: 55 MMHG | RESPIRATION RATE: 17 BRPM

## 2021-02-28 VITALS — DIASTOLIC BLOOD PRESSURE: 51 MMHG | OXYGEN SATURATION: 93 % | SYSTOLIC BLOOD PRESSURE: 105 MMHG | HEART RATE: 107 BPM

## 2021-02-28 VITALS — SYSTOLIC BLOOD PRESSURE: 112 MMHG | OXYGEN SATURATION: 92 % | HEART RATE: 108 BPM | DIASTOLIC BLOOD PRESSURE: 54 MMHG

## 2021-02-28 VITALS
DIASTOLIC BLOOD PRESSURE: 54 MMHG | HEART RATE: 115 BPM | OXYGEN SATURATION: 95 % | SYSTOLIC BLOOD PRESSURE: 98 MMHG | RESPIRATION RATE: 19 BRPM

## 2021-02-28 VITALS
HEART RATE: 108 BPM | RESPIRATION RATE: 23 BRPM | OXYGEN SATURATION: 93 % | SYSTOLIC BLOOD PRESSURE: 96 MMHG | DIASTOLIC BLOOD PRESSURE: 49 MMHG

## 2021-02-28 VITALS — OXYGEN SATURATION: 91 %

## 2021-02-28 VITALS — OXYGEN SATURATION: 93 %

## 2021-02-28 LAB
ALBUMIN LEVEL: 2.4 G/DL (ref 3.5–5)
ALBUMIN/GLOB SERPL: 1.1 {RATIO} (ref 1.1–1.8)
ALP ISO SERPL-ACNC: 82 U/L (ref 38–126)
ALT SERPLBLD-CCNC: 108 U/L (ref 12–78)
ANION GAP SERPL CALC-SCNC: 12.5 MEQ/L (ref 5–15)
APTT PPP: 84 SECONDS (ref 23.6–34)
APTT PPP: 87.1 SECONDS (ref 23.6–34)
APTT PPP: 89.5 SECONDS (ref 23.6–34)
AST SERPL QL: 192 U/L (ref 17–59)
BILIRUBIN,TOTAL: 0.7 MG/DL (ref 0.2–1.3)
BUN SERPL-MCNC: 33 MG/DL (ref 9–20)
CALCIUM SPEC-MCNC: 7 MG/DL (ref 8.4–10.2)
CHLORIDE SPEC-SCNC: 103 MMOL/L (ref 98–107)
CO2 SERPL-SCNC: 18 MMOL/L (ref 22–30)
CREAT BLD-SCNC: 2.4 MG/DL (ref 0.66–1.25)
CREATININE CLEARANCE ESTIMATED: 53 ML/MIN (ref 50–200)
ESTIMATED GLOMERULAR FILT RATE: 28 ML/MIN (ref 60–?)
GFR (AFRICAN AMERICAN): 33 ML/MIN (ref 60–?)
GLOBULIN SER CALC-MCNC: 2.2 G/DL (ref 1.3–3.2)
GLUCOSE BLDC GLUCOMTR-MCNC: 269 MG/DL (ref 70–110)
GLUCOSE BLDC GLUCOMTR-MCNC: 289 MG/DL (ref 70–110)
GLUCOSE BLDC GLUCOMTR-MCNC: 372 MG/DL (ref 70–110)
GLUCOSE BLDC GLUCOMTR-MCNC: 380 MG/DL (ref 70–110)
GLUCOSE: 360 MG/DL (ref 74–100)
HCT VFR BLD CALC: 30.7 % (ref 42–52)
HGB BLD-MCNC: 9.8 G/DL (ref 14.1–18)
MCHC RBC-ENTMCNC: 32 G/DL (ref 31.8–35.4)
MCV RBC: 94.8 FL (ref 80–94)
MEAN CORPUSCULAR HEMOGLOBIN: 30.3 PG (ref 27–31.2)
PLATELET # BLD: 222 K/MM3 (ref 142–424)
POTASSIUM: 4.5 MMOL/L (ref 3.5–5.1)
PROT SERPL-MCNC: 4.6 G/DL (ref 6.3–8.2)
RBC # BLD AUTO: 3.24 M/MM3 (ref 4.6–6.2)
SODIUM SPEC-SCNC: 129 MMOL/L (ref 136–145)
WBC # BLD AUTO: 14.1 K/MM3 (ref 4.8–10.8)

## 2021-02-28 NOTE — PC.NURSE
He is resting in bed.  He has received PRN medication for back pain.  His abdomen is distended.  Per report, his last BM was 2/27/21.  He continues on 3 LPM n/c.  Posterior tibial pulse present on left foot via doppler.  Unable to doppler pulses in

## 2021-02-28 NOTE — PC.NURSE
shift note:  BP better today.  Levo gtt weaned down to  10mcg/min.  Current BP 98/50.  Heparin gtt currently @ 1850 units/hr.  NS infusing @ 150mL/hr.  Tachycardia continues with HR 110s.  O2 sat > 90% on 3L NC.  Tachypnea with RR>20.  No BM today.

## 2021-02-28 NOTE — PC.NURSE
New orders received from [facility] to decreased Heparin gtt to 1850 units/hr and to repeat PTT @ 1800.

## 2021-02-28 NOTE — PC.NURSE
received call from Dr. Koroma.  Discussed POC, including AM labs.  He was notified of all AM labs, including Ca 7.0.  No new orders @ this time.

## 2021-02-28 NOTE — PC.NURSE
received call from [facility] with new orders:  decreased Heparin gtt to 1700 units/hr and repeat PTT @ 2300.

## 2021-02-28 NOTE — PC.NURSE
He is A&Ox4.  He has received PRN medication for abdominal pain.  His abdomen is distended.  Normal bowel sounds.  He reports passing gas.  His lung sounds have scattered wheezing.  He is voiding per f/c.  His urine is yellow, clear.  Temperature of

## 2021-03-01 VITALS
OXYGEN SATURATION: 93 % | HEART RATE: 114 BPM | DIASTOLIC BLOOD PRESSURE: 61 MMHG | RESPIRATION RATE: 20 BRPM | SYSTOLIC BLOOD PRESSURE: 127 MMHG

## 2021-03-01 VITALS — SYSTOLIC BLOOD PRESSURE: 146 MMHG | HEART RATE: 111 BPM | OXYGEN SATURATION: 94 % | DIASTOLIC BLOOD PRESSURE: 64 MMHG

## 2021-03-01 VITALS
RESPIRATION RATE: 18 BRPM | HEART RATE: 112 BPM | SYSTOLIC BLOOD PRESSURE: 134 MMHG | OXYGEN SATURATION: 96 % | DIASTOLIC BLOOD PRESSURE: 68 MMHG

## 2021-03-01 VITALS
DIASTOLIC BLOOD PRESSURE: 62 MMHG | SYSTOLIC BLOOD PRESSURE: 140 MMHG | OXYGEN SATURATION: 93 % | HEART RATE: 119 BPM | RESPIRATION RATE: 20 BRPM

## 2021-03-01 VITALS
TEMPERATURE: 98.78 F | DIASTOLIC BLOOD PRESSURE: 61 MMHG | SYSTOLIC BLOOD PRESSURE: 125 MMHG | RESPIRATION RATE: 17 BRPM | OXYGEN SATURATION: 92 % | HEART RATE: 112 BPM

## 2021-03-01 VITALS — SYSTOLIC BLOOD PRESSURE: 117 MMHG | HEART RATE: 110 BPM | DIASTOLIC BLOOD PRESSURE: 62 MMHG | OXYGEN SATURATION: 95 %

## 2021-03-01 VITALS
DIASTOLIC BLOOD PRESSURE: 70 MMHG | SYSTOLIC BLOOD PRESSURE: 133 MMHG | OXYGEN SATURATION: 92 % | HEART RATE: 112 BPM | RESPIRATION RATE: 18 BRPM

## 2021-03-01 VITALS
TEMPERATURE: 99.5 F | OXYGEN SATURATION: 92 % | SYSTOLIC BLOOD PRESSURE: 130 MMHG | HEART RATE: 110 BPM | RESPIRATION RATE: 20 BRPM | DIASTOLIC BLOOD PRESSURE: 50 MMHG

## 2021-03-01 VITALS
DIASTOLIC BLOOD PRESSURE: 68 MMHG | HEART RATE: 110 BPM | OXYGEN SATURATION: 93 % | SYSTOLIC BLOOD PRESSURE: 143 MMHG | RESPIRATION RATE: 18 BRPM

## 2021-03-01 VITALS
HEART RATE: 110 BPM | DIASTOLIC BLOOD PRESSURE: 63 MMHG | RESPIRATION RATE: 18 BRPM | SYSTOLIC BLOOD PRESSURE: 126 MMHG | OXYGEN SATURATION: 94 %

## 2021-03-01 VITALS — HEART RATE: 112 BPM | DIASTOLIC BLOOD PRESSURE: 59 MMHG | SYSTOLIC BLOOD PRESSURE: 124 MMHG | OXYGEN SATURATION: 93 %

## 2021-03-01 VITALS — HEART RATE: 111 BPM | OXYGEN SATURATION: 94 % | DIASTOLIC BLOOD PRESSURE: 59 MMHG | SYSTOLIC BLOOD PRESSURE: 142 MMHG

## 2021-03-01 VITALS
RESPIRATION RATE: 20 BRPM | SYSTOLIC BLOOD PRESSURE: 122 MMHG | OXYGEN SATURATION: 94 % | DIASTOLIC BLOOD PRESSURE: 60 MMHG | TEMPERATURE: 98.4 F | HEART RATE: 110 BPM

## 2021-03-01 VITALS — HEART RATE: 118 BPM | OXYGEN SATURATION: 94 % | DIASTOLIC BLOOD PRESSURE: 60 MMHG | SYSTOLIC BLOOD PRESSURE: 122 MMHG

## 2021-03-01 VITALS — OXYGEN SATURATION: 94 % | HEART RATE: 88 BPM

## 2021-03-01 VITALS — DIASTOLIC BLOOD PRESSURE: 63 MMHG | SYSTOLIC BLOOD PRESSURE: 122 MMHG | HEART RATE: 113 BPM | OXYGEN SATURATION: 92 %

## 2021-03-01 VITALS — OXYGEN SATURATION: 93 %

## 2021-03-01 VITALS
OXYGEN SATURATION: 93 % | SYSTOLIC BLOOD PRESSURE: 120 MMHG | TEMPERATURE: 98.8 F | RESPIRATION RATE: 20 BRPM | HEART RATE: 110 BPM | DIASTOLIC BLOOD PRESSURE: 60 MMHG

## 2021-03-01 VITALS — HEART RATE: 110 BPM | OXYGEN SATURATION: 92 % | SYSTOLIC BLOOD PRESSURE: 105 MMHG | DIASTOLIC BLOOD PRESSURE: 53 MMHG

## 2021-03-01 VITALS — TEMPERATURE: 98.3 F

## 2021-03-01 LAB
ALBUMIN LEVEL: 2.4 G/DL (ref 3.5–5)
ALBUMIN/GLOB SERPL: 0.9 {RATIO} (ref 1.1–1.8)
ALP ISO SERPL-ACNC: 103 U/L (ref 38–126)
ALT SERPLBLD-CCNC: 115 U/L (ref 12–78)
ANION GAP SERPL CALC-SCNC: 7 MEQ/L (ref 5–15)
APTT PPP: 64.4 SECONDS (ref 23.6–34)
APTT PPP: 65.5 SECONDS (ref 23.6–34)
APTT PPP: 85.1 SECONDS (ref 23.6–34)
AST SERPL QL: 176 U/L (ref 17–59)
BILIRUBIN,TOTAL: 0.8 MG/DL (ref 0.2–1.3)
BUN SERPL-MCNC: 25 MG/DL (ref 9–20)
CALCIUM SPEC-MCNC: 7.7 MG/DL (ref 8.4–10.2)
CHLORIDE SPEC-SCNC: 108 MMOL/L (ref 98–107)
CO2 SERPL-SCNC: 22 MMOL/L (ref 22–30)
CREAT BLD-SCNC: 0.9 MG/DL (ref 0.66–1.25)
CREATININE CLEARANCE ESTIMATED: 127 ML/MIN (ref 50–200)
ESTIMATED GLOMERULAR FILT RATE: 86 ML/MIN (ref 60–?)
GFR (AFRICAN AMERICAN): 104 ML/MIN (ref 60–?)
GLOBULIN SER CALC-MCNC: 2.8 G/DL (ref 1.3–3.2)
GLUCOSE BLDC GLUCOMTR-MCNC: 169 MG/DL (ref 70–110)
GLUCOSE BLDC GLUCOMTR-MCNC: 202 MG/DL (ref 70–110)
GLUCOSE BLDC GLUCOMTR-MCNC: 217 MG/DL (ref 70–110)
GLUCOSE BLDC GLUCOMTR-MCNC: 231 MG/DL (ref 70–110)
GLUCOSE: 228 MG/DL (ref 74–100)
HCT VFR BLD CALC: 25.5 % (ref 42–52)
HGB BLD-MCNC: 8.4 G/DL (ref 14.1–18)
INR PPP: 1.83 (ref 0.9–1.1)
MCHC RBC-ENTMCNC: 32.9 G/DL (ref 31.8–35.4)
MCV RBC: 92.8 FL (ref 80–94)
MEAN CORPUSCULAR HEMOGLOBIN: 30.6 PG (ref 27–31.2)
PLATELET # BLD: 204 K/MM3 (ref 142–424)
POTASSIUM: 4 MMOL/L (ref 3.5–5.1)
PROT SERPL-MCNC: 5.2 G/DL (ref 6.3–8.2)
PT BLD: 20.1 SECONDS (ref 9.4–11.8)
RBC # BLD AUTO: 2.74 M/MM3 (ref 4.6–6.2)
SODIUM SPEC-SCNC: 133 MMOL/L (ref 136–145)
WBC # BLD AUTO: 5.9 K/MM3 (ref 4.8–10.8)

## 2021-03-01 NOTE — DIET.NUTRFU
PO intakes poor- 25-50%. Weight up 13#. Pt has c/o nausea and with abd. distention. He had 2 BMs 2/27. BG critically high at admit, have stabilized with medication changes but remain high- avg. 250. Liver enzymes remain elevated. Pt with ADA/Cardiac

## 2021-03-01 NOTE — PC.NURSE
Dr Koroma and patient both request he be sat up. PT placed in  chair  position in the bed. Withing 2 minutes of being in this position, he began saying to  lay me down! Lay me down!  pt again placed flat in bed with hob elevated to 30 degrees

## 2021-03-01 NOTE — PC.NURSE
pt has slept off and on most of the shift. Pt ate very few bites of lunch., pt was not interested in attempting to feed himself, but did eat a few bites then stated he wanted to rest. pt apprears to have labored breathing, but when asked he states

## 2021-03-01 NOTE — PC.NURSE
Trace non pitting edema noted to ble. pt rle is cool to touch from mid shin down. pt heel is purple in color. bottom of pt foot is mottled purple. bottoms of all 5 toes are purple in color as well. skin is non blancheable. no palpable pulse present

## 2021-03-02 VITALS
RESPIRATION RATE: 29 BRPM | TEMPERATURE: 99.14 F | SYSTOLIC BLOOD PRESSURE: 113 MMHG | HEART RATE: 108 BPM | DIASTOLIC BLOOD PRESSURE: 42 MMHG | OXYGEN SATURATION: 91 %

## 2021-03-02 VITALS
HEART RATE: 107 BPM | TEMPERATURE: 98.78 F | RESPIRATION RATE: 23 BRPM | SYSTOLIC BLOOD PRESSURE: 131 MMHG | OXYGEN SATURATION: 94 % | DIASTOLIC BLOOD PRESSURE: 58 MMHG

## 2021-03-02 VITALS
SYSTOLIC BLOOD PRESSURE: 105 MMHG | DIASTOLIC BLOOD PRESSURE: 47 MMHG | HEART RATE: 100 BPM | OXYGEN SATURATION: 91 % | RESPIRATION RATE: 22 BRPM | TEMPERATURE: 98.3 F

## 2021-03-02 VITALS
DIASTOLIC BLOOD PRESSURE: 62 MMHG | TEMPERATURE: 98.7 F | RESPIRATION RATE: 19 BRPM | OXYGEN SATURATION: 89 % | HEART RATE: 107 BPM | SYSTOLIC BLOOD PRESSURE: 135 MMHG

## 2021-03-02 VITALS
OXYGEN SATURATION: 92 % | HEART RATE: 109 BPM | TEMPERATURE: 98.42 F | SYSTOLIC BLOOD PRESSURE: 110 MMHG | DIASTOLIC BLOOD PRESSURE: 45 MMHG | RESPIRATION RATE: 19 BRPM

## 2021-03-02 VITALS
RESPIRATION RATE: 21 BRPM | TEMPERATURE: 97.9 F | HEART RATE: 105 BPM | DIASTOLIC BLOOD PRESSURE: 53 MMHG | OXYGEN SATURATION: 95 % | SYSTOLIC BLOOD PRESSURE: 104 MMHG

## 2021-03-02 VITALS
TEMPERATURE: 98.96 F | SYSTOLIC BLOOD PRESSURE: 106 MMHG | OXYGEN SATURATION: 92 % | DIASTOLIC BLOOD PRESSURE: 62 MMHG | HEART RATE: 110 BPM | RESPIRATION RATE: 19 BRPM

## 2021-03-02 VITALS — HEART RATE: 106 BPM

## 2021-03-02 LAB
ANION GAP SERPL CALC-SCNC: 9 MEQ/L (ref 5–15)
APTT PPP: 68.9 SECONDS (ref 23.6–34)
BUN SERPL-MCNC: 22 MG/DL (ref 9–20)
CALCIUM SPEC-MCNC: 8.5 MG/DL (ref 8.4–10.2)
CHLORIDE SPEC-SCNC: 104 MMOL/L (ref 98–107)
CO2 SERPL-SCNC: 24 MMOL/L (ref 22–30)
CREAT BLD-SCNC: 0.9 MG/DL (ref 0.66–1.25)
CREATININE CLEARANCE ESTIMATED: 70 ML/MIN (ref 50–200)
ESTIMATED GLOMERULAR FILT RATE: 86 ML/MIN (ref 60–?)
GFR (AFRICAN AMERICAN): 104 ML/MIN (ref 60–?)
GLUCOSE BLDC GLUCOMTR-MCNC: 152 MG/DL (ref 70–110)
GLUCOSE BLDC GLUCOMTR-MCNC: 165 MG/DL (ref 70–110)
GLUCOSE BLDC GLUCOMTR-MCNC: 179 MG/DL (ref 70–110)
GLUCOSE BLDC GLUCOMTR-MCNC: 183 MG/DL (ref 70–110)
GLUCOSE: 175 MG/DL (ref 74–100)
HCT VFR BLD CALC: 27.3 % (ref 42–52)
HGB BLD-MCNC: 8.8 G/DL (ref 14.1–18)
INR PPP: 3.52 (ref 0.9–1.1)
MCHC RBC-ENTMCNC: 32.2 G/DL (ref 31.8–35.4)
MCV RBC: 94.6 FL (ref 80–94)
MEAN CORPUSCULAR HEMOGLOBIN: 30.5 PG (ref 27–31.2)
PLATELET # BLD: 291 K/MM3 (ref 142–424)
POTASSIUM: 4 MMOL/L (ref 3.5–5.1)
PT BLD: 37.8 SECONDS (ref 9.4–11.8)
RBC # BLD AUTO: 2.89 M/MM3 (ref 4.6–6.2)
SODIUM SPEC-SCNC: 133 MMOL/L (ref 136–145)
WBC # BLD AUTO: 8.8 K/MM3 (ref 4.8–10.8)

## 2021-03-02 NOTE — PC.NURSE
Pt a&O x4 and has slept on and off through the night. PT given morphine and norco x1 for 9/10 lower back pain with desired effects. Lung sounds are diminished, on 3L NC through the night, sats in the low 90s when sleeping. Abd is large and round but

## 2021-03-02 NOTE — PC.NURSE
No acute changes noted this shift, pt has been up to chair this morning requiring maximum assistance per staff, was unable to bear weight at all, was only up to chair for around 30 minutes and began screaming to go back to bed, patient place in

## 2021-03-03 VITALS — OXYGEN SATURATION: 78 % | HEART RATE: 145 BPM | SYSTOLIC BLOOD PRESSURE: 142 MMHG | DIASTOLIC BLOOD PRESSURE: 110 MMHG

## 2021-03-03 VITALS — HEART RATE: 129 BPM | DIASTOLIC BLOOD PRESSURE: 44 MMHG | SYSTOLIC BLOOD PRESSURE: 79 MMHG | OXYGEN SATURATION: 91 %

## 2021-03-03 VITALS — SYSTOLIC BLOOD PRESSURE: 83 MMHG | DIASTOLIC BLOOD PRESSURE: 21 MMHG

## 2021-03-03 VITALS — SYSTOLIC BLOOD PRESSURE: 130 MMHG | HEART RATE: 135 BPM | OXYGEN SATURATION: 97 % | DIASTOLIC BLOOD PRESSURE: 64 MMHG

## 2021-03-03 VITALS — HEART RATE: 138 BPM | OXYGEN SATURATION: 92 % | DIASTOLIC BLOOD PRESSURE: 53 MMHG | SYSTOLIC BLOOD PRESSURE: 157 MMHG

## 2021-03-03 VITALS — HEART RATE: 121 BPM | DIASTOLIC BLOOD PRESSURE: 45 MMHG | OXYGEN SATURATION: 83 % | SYSTOLIC BLOOD PRESSURE: 101 MMHG

## 2021-03-03 VITALS — SYSTOLIC BLOOD PRESSURE: 123 MMHG | OXYGEN SATURATION: 91 % | DIASTOLIC BLOOD PRESSURE: 55 MMHG | HEART RATE: 108 BPM

## 2021-03-03 VITALS — HEART RATE: 134 BPM | OXYGEN SATURATION: 93 % | SYSTOLIC BLOOD PRESSURE: 95 MMHG | DIASTOLIC BLOOD PRESSURE: 51 MMHG

## 2021-03-03 VITALS — HEART RATE: 128 BPM | DIASTOLIC BLOOD PRESSURE: 28 MMHG | SYSTOLIC BLOOD PRESSURE: 78 MMHG | OXYGEN SATURATION: 86 %

## 2021-03-03 VITALS — SYSTOLIC BLOOD PRESSURE: 93 MMHG | DIASTOLIC BLOOD PRESSURE: 46 MMHG | HEART RATE: 108 BPM

## 2021-03-03 VITALS — DIASTOLIC BLOOD PRESSURE: 36 MMHG | OXYGEN SATURATION: 97 % | SYSTOLIC BLOOD PRESSURE: 54 MMHG | HEART RATE: 130 BPM

## 2021-03-03 VITALS — HEART RATE: 35 BPM | SYSTOLIC BLOOD PRESSURE: 81 MMHG | DIASTOLIC BLOOD PRESSURE: 43 MMHG

## 2021-03-03 VITALS — HEART RATE: 106 BPM | OXYGEN SATURATION: 81 % | SYSTOLIC BLOOD PRESSURE: 67 MMHG | DIASTOLIC BLOOD PRESSURE: 36 MMHG

## 2021-03-03 VITALS — HEART RATE: 117 BPM | OXYGEN SATURATION: 81 % | DIASTOLIC BLOOD PRESSURE: 34 MMHG | SYSTOLIC BLOOD PRESSURE: 72 MMHG

## 2021-03-03 VITALS — HEART RATE: 125 BPM | OXYGEN SATURATION: 83 % | SYSTOLIC BLOOD PRESSURE: 141 MMHG | DIASTOLIC BLOOD PRESSURE: 80 MMHG

## 2021-03-03 VITALS — RESPIRATION RATE: 42 BRPM | DIASTOLIC BLOOD PRESSURE: 63 MMHG | SYSTOLIC BLOOD PRESSURE: 114 MMHG | HEART RATE: 119 BPM

## 2021-03-03 VITALS — HEART RATE: 120 BPM

## 2021-03-03 VITALS — RESPIRATION RATE: 22 BRPM

## 2021-03-03 LAB
CO2 BLDCOA-SCNC: 20 MMHG (ref 23–27)
GLUCOSE BLDC GLUCOMTR-MCNC: 188 MG/DL (ref 70–110)
HCO3 BLDA-SCNC: 18.5 MMHG (ref 22–26)
PCO2 BLDA: 47.3 MMHG (ref 35–45)
PH BLDA: 7.21 MMOL/L (ref 7.35–7.45)
PO2 BLDA: 51.3 MMHG (ref 80–100)
SOURCE: (no result)

## 2021-03-03 NOTE — PC.NURSE
0330  blood pressure continued to decrease levophed began, titrated to keep map greater than 60.  patient remains unresponsive. family has been notified of patient status change and they are on their way.

## 2021-03-03 NOTE — PC.NURSE
0400 levophed max out with blood pressures in the 70s, hr 120s, sats in the low 90s, dr. tapia notified, order received for stat abgs.  unable to obtain due to poor profusion, dr. tpaia back at bedside.  orders received for 2 liter fluid bolus and

## 2021-03-03 NOTE — PC.NURSE
shift summary patient awake alert and oriented.  restless and anxious, patient regularly yells out for help and moans loudly, able to hear patient at .   when asked what was wrong he couldn't really answer, patient wants turned from side

## 2021-03-03 NOTE — PC.NURSE
0212  o2 sats continue to fall, rapid red called with sats at 58%, began bagging patient with ambu bag.

## 2021-03-03 NOTE — PC.NURSE
code blue called at 0435 and compression started. md at bedside. epi given at 0436. bicarb given at 0437. pulse check asystole 0439. code called and pronounced at 0441 by md

## 2021-03-03 NOTE — PC.NURSE
0115 patient anxious in bed saying he was hurting in back and abdomen, treated for pain and anxiety.  0130  patient began projectile vomiting repeatedly, o2 sats began dropping.  zofran given ivp. respiratory therapy called to beside.  patient
